# Patient Record
Sex: FEMALE | Race: BLACK OR AFRICAN AMERICAN | NOT HISPANIC OR LATINO | URBAN - METROPOLITAN AREA
[De-identification: names, ages, dates, MRNs, and addresses within clinical notes are randomized per-mention and may not be internally consistent; named-entity substitution may affect disease eponyms.]

---

## 2022-12-27 ENCOUNTER — INPATIENT (INPATIENT)
Facility: HOSPITAL | Age: 82
LOS: 2 days | Discharge: NURSING, REHAB OR LONG-TERM CARE | DRG: 175 | End: 2022-12-30
Admitting: INTERNAL MEDICINE
Payer: MEDICARE

## 2022-12-27 VITALS
OXYGEN SATURATION: 97 % | SYSTOLIC BLOOD PRESSURE: 131 MMHG | RESPIRATION RATE: 20 BRPM | TEMPERATURE: 99 F | DIASTOLIC BLOOD PRESSURE: 67 MMHG | HEART RATE: 106 BPM

## 2022-12-27 DIAGNOSIS — F03.90 UNSPECIFIED DEMENTIA WITHOUT BEHAVIORAL DISTURBANCE: ICD-10-CM

## 2022-12-27 DIAGNOSIS — I26.99 OTHER PULMONARY EMBOLISM WITHOUT ACUTE COR PULMONALE: ICD-10-CM

## 2022-12-27 DIAGNOSIS — Z98.890 OTHER SPECIFIED POSTPROCEDURAL STATES: Chronic | ICD-10-CM

## 2022-12-27 PROCEDURE — 99291 CRITICAL CARE FIRST HOUR: CPT

## 2022-12-27 PROCEDURE — 99292 CRITICAL CARE ADDL 30 MIN: CPT

## 2022-12-27 PROCEDURE — 99223 1ST HOSP IP/OBS HIGH 75: CPT

## 2022-12-27 RX ORDER — PHENYLEPHRINE HYDROCHLORIDE 10 MG/ML
1 INJECTION INTRAVENOUS
Qty: 40 | Refills: 0 | Status: DISCONTINUED | OUTPATIENT
Start: 2022-12-27 | End: 2022-12-28

## 2022-12-27 RX ORDER — FAMOTIDINE 10 MG/ML
20 INJECTION INTRAVENOUS
Refills: 0 | Status: DISCONTINUED | OUTPATIENT
Start: 2022-12-27 | End: 2022-12-30

## 2022-12-27 RX ORDER — DOBUTAMINE HCL 250MG/20ML
2.5 VIAL (ML) INTRAVENOUS
Qty: 500 | Refills: 0 | Status: DISCONTINUED | OUTPATIENT
Start: 2022-12-27 | End: 2022-12-28

## 2022-12-27 RX ORDER — SODIUM CHLORIDE 9 MG/ML
3 INJECTION INTRAMUSCULAR; INTRAVENOUS; SUBCUTANEOUS EVERY 8 HOURS
Refills: 0 | Status: DISCONTINUED | OUTPATIENT
Start: 2022-12-27 | End: 2022-12-30

## 2022-12-27 RX ORDER — HEPARIN SODIUM 5000 [USP'U]/ML
800 INJECTION INTRAVENOUS; SUBCUTANEOUS
Qty: 25000 | Refills: 0 | Status: DISCONTINUED | OUTPATIENT
Start: 2022-12-27 | End: 2022-12-29

## 2022-12-27 NOTE — H&P ADULT - HISTORY OF PRESENT ILLNESS
83 y/o F transfer [facility] pmhx dementia?,  epilepsy, GERD, diverticulitis s/p bowel resection, SVT and recent ORIF for femoral neck fx who presents from NH with AMS acute hypoxic respiratory failure. Patient admitted to ICU with working diagnosis of septic shock due to presume pneumonia. The patient has had a persistent low dose vasopressor requirement. This am the patient developed new onset Afib with RVR and increasing hypoxia. She later underwent CTA PE and was found to have extensive bilateral PEs with e/o right heart strain. Ultimately decision made to administer 1/2 dose tPA. Her pressors were changed to phenylephrine and dobutamine from levophed. Transferred on heparin drip on nasal cannula stable. Admit to Dr. Martínez for evaluation.

## 2022-12-27 NOTE — H&P ADULT - NSICDXPASTMEDICALHX_GEN_ALL_CORE_FT
PAST MEDICAL HISTORY:  Epilepsy     Femoral neck fracture     SVT (supraventricular tachycardia)

## 2022-12-27 NOTE — H&P ADULT - NSHPLABSRESULTS_GEN_ALL_CORE
83 y/o F [facility]?,  epilepsy, GERD, diverticulitis s/p bowel resection, SVT and recent ORIF for femoral neck fx who presents from NH with AMS acute hypoxic respiratory failure. Patient admitted to ICU with working diagnosis of septic shock due to presume pneumonia. The patient has had a persistent low dose vasopressor requirement. This am the patient developed new onset Afib with RVR and increasing hypoxia. She later underwent CTA PE and was found to have extensive bilateral PEs with e/o right heart strain. Ultimately decision made to administer 1/2 dose tPA. Her pressors were changed to phenylephrine and dobutamine from levophed. Transferred on heparin drip on nasal cannula stable. Admit to Dr. Matrínez for evaluation.

## 2022-12-27 NOTE — H&P ADULT - ASSESSMENT
81 y/o F [facility] pmhx dementia?,  epilepsy, GERD, diverticulitis s/p bowel resection, SVT and recent ORIF for femoral neck fx who presents from NH with AMS acute hypoxic respiratory failure. Patient admitted to ICU with working diagnosis of septic shock due to presume pneumonia. The patient has had a persistent low dose vasopressor requirement. This am the patient developed new onset Afib with RVR and increasing hypoxia. She later underwent CTA PE and was found to have extensive bilateral PEs with e/o right heart strain. Ultimately decision made to administer 1/2 dose tPA. Her pressors were changed to phenylephrine and dobutamine from levophed. Transferred on heparin drip on nasal cannula stable. Admit to Dr. Martínez for evaluation.

## 2022-12-27 NOTE — H&P ADULT - NSHPPHYSICALEXAM_GEN_ALL_CORE
Physical Exam  CONSTITUTIONAL:                                                              cachectic  NEURO:                                                                       decreased mental status /dementia?                      EYES:                                                                                WNL  ENMT:                                                                               WNL  CV:                                                                                   WNL  RESPIRATORY:                                                                no wheezes  GI:                                                                                     WNL  : BOATENG +                                                               boateng in place  MUSCULOSKELETAL:                                                       WNL  SKIN / BREAST:                                                                  WNL

## 2022-12-27 NOTE — H&P ADULT - PROBLEM SELECTOR PLAN 1
full dose heparin with serial PTT  NPO  continue dobutamine  wheen Phenylphrine if possible  amiodarone prn for AF

## 2022-12-28 DIAGNOSIS — G40.909 EPILEPSY, UNSPECIFIED, NOT INTRACTABLE, WITHOUT STATUS EPILEPTICUS: ICD-10-CM

## 2022-12-28 DIAGNOSIS — I47.1 SUPRAVENTRICULAR TACHYCARDIA: ICD-10-CM

## 2022-12-28 DIAGNOSIS — I26.99 OTHER PULMONARY EMBOLISM WITHOUT ACUTE COR PULMONALE: ICD-10-CM

## 2022-12-28 DIAGNOSIS — I48.91 UNSPECIFIED ATRIAL FIBRILLATION: ICD-10-CM

## 2022-12-28 DIAGNOSIS — Z29.9 ENCOUNTER FOR PROPHYLACTIC MEASURES, UNSPECIFIED: ICD-10-CM

## 2022-12-28 DIAGNOSIS — Z87.898 PERSONAL HISTORY OF OTHER SPECIFIED CONDITIONS: ICD-10-CM

## 2022-12-28 DIAGNOSIS — I82.412 ACUTE EMBOLISM AND THROMBOSIS OF LEFT FEMORAL VEIN: ICD-10-CM

## 2022-12-28 LAB
A1C WITH ESTIMATED AVERAGE GLUCOSE RESULT: 5 % — SIGNIFICANT CHANGE UP (ref 4–5.6)
ALBUMIN SERPL ELPH-MCNC: 2 G/DL — LOW (ref 3.3–5)
ALBUMIN SERPL ELPH-MCNC: 2.1 G/DL — LOW (ref 3.3–5)
ALBUMIN SERPL ELPH-MCNC: 2.3 G/DL — LOW (ref 3.3–5)
ALP SERPL-CCNC: 131 U/L — HIGH (ref 40–120)
ALP SERPL-CCNC: 79 U/L — SIGNIFICANT CHANGE UP (ref 40–120)
ALP SERPL-CCNC: 87 U/L — SIGNIFICANT CHANGE UP (ref 40–120)
ALT FLD-CCNC: 7 U/L — LOW (ref 10–45)
ALT FLD-CCNC: 7 U/L — LOW (ref 10–45)
ALT FLD-CCNC: 8 U/L — LOW (ref 10–45)
ANION GAP SERPL CALC-SCNC: 8 MMOL/L — SIGNIFICANT CHANGE UP (ref 5–17)
APTT BLD: 66.9 SEC — HIGH (ref 27.5–35.5)
APTT BLD: 67.7 SEC — HIGH (ref 27.5–35.5)
APTT BLD: 69.1 SEC — HIGH (ref 27.5–35.5)
AST SERPL-CCNC: 12 U/L — SIGNIFICANT CHANGE UP (ref 10–40)
BILIRUB SERPL-MCNC: 0.4 MG/DL — SIGNIFICANT CHANGE UP (ref 0.2–1.2)
BILIRUB SERPL-MCNC: 0.8 MG/DL — SIGNIFICANT CHANGE UP (ref 0.2–1.2)
BILIRUB SERPL-MCNC: 1 MG/DL — SIGNIFICANT CHANGE UP (ref 0.2–1.2)
BLD GP AB SCN SERPL QL: NEGATIVE — SIGNIFICANT CHANGE UP
BUN SERPL-MCNC: 10 MG/DL — SIGNIFICANT CHANGE UP (ref 7–23)
BUN SERPL-MCNC: 10 MG/DL — SIGNIFICANT CHANGE UP (ref 7–23)
BUN SERPL-MCNC: 8 MG/DL — SIGNIFICANT CHANGE UP (ref 7–23)
CALCIUM SERPL-MCNC: 7.8 MG/DL — LOW (ref 8.4–10.5)
CALCIUM SERPL-MCNC: 7.9 MG/DL — LOW (ref 8.4–10.5)
CALCIUM SERPL-MCNC: 8 MG/DL — LOW (ref 8.4–10.5)
CHLORIDE SERPL-SCNC: 97 MMOL/L — SIGNIFICANT CHANGE UP (ref 96–108)
CHLORIDE SERPL-SCNC: 99 MMOL/L — SIGNIFICANT CHANGE UP (ref 96–108)
CHLORIDE SERPL-SCNC: 99 MMOL/L — SIGNIFICANT CHANGE UP (ref 96–108)
CHOLEST SERPL-MCNC: 127 MG/DL — SIGNIFICANT CHANGE UP
CO2 SERPL-SCNC: 23 MMOL/L — SIGNIFICANT CHANGE UP (ref 22–31)
CO2 SERPL-SCNC: 23 MMOL/L — SIGNIFICANT CHANGE UP (ref 22–31)
CO2 SERPL-SCNC: 24 MMOL/L — SIGNIFICANT CHANGE UP (ref 22–31)
CREAT SERPL-MCNC: 0.34 MG/DL — LOW (ref 0.5–1.3)
CREAT SERPL-MCNC: 0.39 MG/DL — LOW (ref 0.5–1.3)
CREAT SERPL-MCNC: 0.4 MG/DL — LOW (ref 0.5–1.3)
EGFR: 101 ML/MIN/1.73M2 — SIGNIFICANT CHANGE UP
EGFR: 98 ML/MIN/1.73M2 — SIGNIFICANT CHANGE UP
EGFR: 98 ML/MIN/1.73M2 — SIGNIFICANT CHANGE UP
ESTIMATED AVERAGE GLUCOSE: 97 MG/DL — SIGNIFICANT CHANGE UP (ref 68–114)
GAS PNL BLDA: SIGNIFICANT CHANGE UP
GAS PNL BLDA: SIGNIFICANT CHANGE UP
GLUCOSE BLDC GLUCOMTR-MCNC: 69 MG/DL — LOW (ref 70–99)
GLUCOSE BLDC GLUCOMTR-MCNC: 83 MG/DL — SIGNIFICANT CHANGE UP (ref 70–99)
GLUCOSE SERPL-MCNC: 73 MG/DL — SIGNIFICANT CHANGE UP (ref 70–99)
GLUCOSE SERPL-MCNC: 87 MG/DL — SIGNIFICANT CHANGE UP (ref 70–99)
GLUCOSE SERPL-MCNC: 88 MG/DL — SIGNIFICANT CHANGE UP (ref 70–99)
HCT VFR BLD CALC: 28.4 % — LOW (ref 34.5–45)
HCT VFR BLD CALC: 29 % — LOW (ref 34.5–45)
HCT VFR BLD CALC: 29.1 % — LOW (ref 34.5–45)
HDLC SERPL-MCNC: 42 MG/DL — LOW
HGB BLD-MCNC: 10.1 G/DL — LOW (ref 11.5–15.5)
HGB BLD-MCNC: 10.1 G/DL — LOW (ref 11.5–15.5)
HGB BLD-MCNC: 9.9 G/DL — LOW (ref 11.5–15.5)
INR BLD: 1.58 — HIGH (ref 0.88–1.16)
INR BLD: 1.6 — HIGH (ref 0.88–1.16)
INR BLD: 1.61 — HIGH (ref 0.88–1.16)
LACTATE SERPL-SCNC: 0.6 MMOL/L — SIGNIFICANT CHANGE UP (ref 0.5–2)
LACTATE SERPL-SCNC: 0.7 MMOL/L — SIGNIFICANT CHANGE UP (ref 0.5–2)
LIPID PNL WITH DIRECT LDL SERPL: 67 MG/DL — SIGNIFICANT CHANGE UP
MAGNESIUM SERPL-MCNC: 1.8 MG/DL — SIGNIFICANT CHANGE UP (ref 1.6–2.6)
MAGNESIUM SERPL-MCNC: 1.8 MG/DL — SIGNIFICANT CHANGE UP (ref 1.6–2.6)
MAGNESIUM SERPL-MCNC: 1.9 MG/DL — SIGNIFICANT CHANGE UP (ref 1.6–2.6)
MCHC RBC-ENTMCNC: 31.3 PG — SIGNIFICANT CHANGE UP (ref 27–34)
MCHC RBC-ENTMCNC: 31.6 PG — SIGNIFICANT CHANGE UP (ref 27–34)
MCHC RBC-ENTMCNC: 31.7 PG — SIGNIFICANT CHANGE UP (ref 27–34)
MCHC RBC-ENTMCNC: 34.7 GM/DL — SIGNIFICANT CHANGE UP (ref 32–36)
MCHC RBC-ENTMCNC: 34.8 GM/DL — SIGNIFICANT CHANGE UP (ref 32–36)
MCHC RBC-ENTMCNC: 34.9 GM/DL — SIGNIFICANT CHANGE UP (ref 32–36)
MCV RBC AUTO: 90.1 FL — SIGNIFICANT CHANGE UP (ref 80–100)
MCV RBC AUTO: 90.7 FL — SIGNIFICANT CHANGE UP (ref 80–100)
MCV RBC AUTO: 90.9 FL — SIGNIFICANT CHANGE UP (ref 80–100)
NON HDL CHOLESTEROL: 85 MG/DL — SIGNIFICANT CHANGE UP
NRBC # BLD: 0 /100 WBCS — SIGNIFICANT CHANGE UP (ref 0–0)
NT-PROBNP SERPL-SCNC: HIGH PG/ML (ref 0–300)
OSMOLALITY UR: 472 MOSM/KG — SIGNIFICANT CHANGE UP (ref 300–900)
PHOSPHATE SERPL-MCNC: 2.8 MG/DL — SIGNIFICANT CHANGE UP (ref 2.5–4.5)
PHOSPHATE SERPL-MCNC: 3 MG/DL — SIGNIFICANT CHANGE UP (ref 2.5–4.5)
PHOSPHATE SERPL-MCNC: 3.4 MG/DL — SIGNIFICANT CHANGE UP (ref 2.5–4.5)
PLATELET # BLD AUTO: 194 K/UL — SIGNIFICANT CHANGE UP (ref 150–400)
PLATELET # BLD AUTO: 199 K/UL — SIGNIFICANT CHANGE UP (ref 150–400)
PLATELET # BLD AUTO: 204 K/UL — SIGNIFICANT CHANGE UP (ref 150–400)
POTASSIUM SERPL-MCNC: 3.5 MMOL/L — SIGNIFICANT CHANGE UP (ref 3.5–5.3)
POTASSIUM SERPL-MCNC: 3.6 MMOL/L — SIGNIFICANT CHANGE UP (ref 3.5–5.3)
POTASSIUM SERPL-MCNC: 3.6 MMOL/L — SIGNIFICANT CHANGE UP (ref 3.5–5.3)
POTASSIUM SERPL-SCNC: 3.5 MMOL/L — SIGNIFICANT CHANGE UP (ref 3.5–5.3)
POTASSIUM SERPL-SCNC: 3.6 MMOL/L — SIGNIFICANT CHANGE UP (ref 3.5–5.3)
POTASSIUM SERPL-SCNC: 3.6 MMOL/L — SIGNIFICANT CHANGE UP (ref 3.5–5.3)
PROT SERPL-MCNC: 4.8 G/DL — LOW (ref 6–8.3)
PROT SERPL-MCNC: 5 G/DL — LOW (ref 6–8.3)
PROT SERPL-MCNC: 5.1 G/DL — LOW (ref 6–8.3)
PROTHROM AB SERPL-ACNC: 18.9 SEC — HIGH (ref 10.5–13.4)
PROTHROM AB SERPL-ACNC: 19.1 SEC — HIGH (ref 10.5–13.4)
PROTHROM AB SERPL-ACNC: 19.2 SEC — HIGH (ref 10.5–13.4)
RBC # BLD: 3.13 M/UL — LOW (ref 3.8–5.2)
RBC # BLD: 3.19 M/UL — LOW (ref 3.8–5.2)
RBC # BLD: 3.23 M/UL — LOW (ref 3.8–5.2)
RBC # FLD: 14.9 % — HIGH (ref 10.3–14.5)
RBC # FLD: 15 % — HIGH (ref 10.3–14.5)
RBC # FLD: 15 % — HIGH (ref 10.3–14.5)
RH IG SCN BLD-IMP: POSITIVE — SIGNIFICANT CHANGE UP
SODIUM SERPL-SCNC: 128 MMOL/L — LOW (ref 135–145)
SODIUM SERPL-SCNC: 130 MMOL/L — LOW (ref 135–145)
SODIUM SERPL-SCNC: 131 MMOL/L — LOW (ref 135–145)
SODIUM UR-SCNC: 163 MMOL/L — SIGNIFICANT CHANGE UP
T4 AB SER-ACNC: 5.54 UG/DL — SIGNIFICANT CHANGE UP (ref 4.5–11.7)
TRIGL SERPL-MCNC: 89 MG/DL — SIGNIFICANT CHANGE UP
TROPONIN T SERPL-MCNC: 0.06 NG/ML — CRITICAL HIGH (ref 0–0.01)
TSH SERPL-MCNC: 4.37 UIU/ML — HIGH (ref 0.27–4.2)
WBC # BLD: 10.98 K/UL — HIGH (ref 3.8–10.5)
WBC # BLD: 12.3 K/UL — HIGH (ref 3.8–10.5)
WBC # BLD: 12.35 K/UL — HIGH (ref 3.8–10.5)
WBC # FLD AUTO: 10.98 K/UL — HIGH (ref 3.8–10.5)
WBC # FLD AUTO: 12.3 K/UL — HIGH (ref 3.8–10.5)
WBC # FLD AUTO: 12.35 K/UL — HIGH (ref 3.8–10.5)

## 2022-12-28 PROCEDURE — 99292 CRITICAL CARE ADDL 30 MIN: CPT | Mod: 25

## 2022-12-28 PROCEDURE — 71045 X-RAY EXAM CHEST 1 VIEW: CPT | Mod: 26

## 2022-12-28 PROCEDURE — 99233 SBSQ HOSP IP/OBS HIGH 50: CPT | Mod: GC

## 2022-12-28 PROCEDURE — 36620 INSERTION CATHETER ARTERY: CPT

## 2022-12-28 PROCEDURE — 93306 TTE W/DOPPLER COMPLETE: CPT | Mod: 26

## 2022-12-28 PROCEDURE — 99291 CRITICAL CARE FIRST HOUR: CPT | Mod: 25

## 2022-12-28 RX ORDER — POTASSIUM CHLORIDE 20 MEQ
20 PACKET (EA) ORAL
Refills: 0 | Status: COMPLETED | OUTPATIENT
Start: 2022-12-28 | End: 2022-12-28

## 2022-12-28 RX ORDER — SODIUM CHLORIDE 9 MG/ML
500 INJECTION, SOLUTION INTRAVENOUS
Refills: 0 | Status: DISCONTINUED | OUTPATIENT
Start: 2022-12-28 | End: 2022-12-29

## 2022-12-28 RX ORDER — DIVALPROEX SODIUM 500 MG/1
500 TABLET, DELAYED RELEASE ORAL
Refills: 0 | Status: DISCONTINUED | OUTPATIENT
Start: 2022-12-28 | End: 2022-12-30

## 2022-12-28 RX ORDER — PHENOBARBITAL 60 MG
16.2 TABLET ORAL EVERY 12 HOURS
Refills: 0 | Status: DISCONTINUED | OUTPATIENT
Start: 2022-12-28 | End: 2022-12-30

## 2022-12-28 RX ORDER — MAGNESIUM SULFATE 500 MG/ML
2 VIAL (ML) INJECTION ONCE
Refills: 0 | Status: COMPLETED | OUTPATIENT
Start: 2022-12-28 | End: 2022-12-28

## 2022-12-28 RX ADMIN — DIVALPROEX SODIUM 500 MILLIGRAM(S): 500 TABLET, DELAYED RELEASE ORAL at 10:22

## 2022-12-28 RX ADMIN — Medication 25 GRAM(S): at 11:54

## 2022-12-28 RX ADMIN — SODIUM CHLORIDE 3 MILLILITER(S): 9 INJECTION INTRAMUSCULAR; INTRAVENOUS; SUBCUTANEOUS at 22:33

## 2022-12-28 RX ADMIN — Medication 50 MILLIEQUIVALENT(S): at 10:59

## 2022-12-28 RX ADMIN — FAMOTIDINE 20 MILLIGRAM(S): 10 INJECTION INTRAVENOUS at 18:26

## 2022-12-28 RX ADMIN — Medication 16.2 MILLIGRAM(S): at 10:20

## 2022-12-28 RX ADMIN — SODIUM CHLORIDE 3 MILLILITER(S): 9 INJECTION INTRAMUSCULAR; INTRAVENOUS; SUBCUTANEOUS at 06:32

## 2022-12-28 RX ADMIN — Medication 50 MILLIEQUIVALENT(S): at 12:51

## 2022-12-28 RX ADMIN — SODIUM CHLORIDE 3 MILLILITER(S): 9 INJECTION INTRAMUSCULAR; INTRAVENOUS; SUBCUTANEOUS at 15:48

## 2022-12-28 RX ADMIN — Medication 50 MILLIEQUIVALENT(S): at 14:57

## 2022-12-28 NOTE — PATIENT PROFILE ADULT - FALL HARM RISK - HARM RISK INTERVENTIONS
Assistance with ambulation/Assistance OOB with selected safe patient handling equipment/Communicate Risk of Fall with Harm to all staff/Discuss with provider need for PT consult/Monitor for mental status changes/Monitor gait and stability/Reinforce activity limits and safety measures with patient and family/Reorient to person, place and time as needed/Tailored Fall Risk Interventions/Visual Cue: Yellow wristband and red socks/Bed in lowest position, wheels locked, appropriate side rails in place/Call bell, personal items and telephone in reach/Instruct patient to call for assistance before getting out of bed or chair/Non-slip footwear when patient is out of bed/[place] to call system/Physically safe environment - no spills, clutter or unnecessary equipment/Purposeful Proactive Rounding/Room/bathroom lighting operational, light cord in reach

## 2022-12-28 NOTE — PROGRESS NOTE ADULT - PROBLEM SELECTOR PLAN 1
- full dose heparin with serial PTT  - NPO  - continue dobutamine  - can continue to wean off Phenylphrine Patient tranferred from OSH with massive b/l PE's  - full dose heparin with serial PTT, 12/28 PTT 69.1  - continue dobutamine  - can continue to wean off Phenylphrine Patient transferred from OSH with massive b/l PE's  - full dose heparin with serial PTT, 12/28 PTT 69.1  - continue dobutamine  - can continue to wean off Phenylphrine Patient transferred from OSH with massive b/l PE's and LLE DVT.   - full dose heparin gtt with serial PTT  - 12/28 PTT 69.1, f/u AM PTT  - echo on 12/28 showed: 1. Normal left ventricular size and systolic function.   2. Mildly dilated right ventricular size.   3. Right ventricular systolic function is reduced with apical sparing consistent with acute pulmonary embolism (Fulton's sign).   4. Mildly dilated right atrium.   5. Mild-to-moderate mitral regurgitation.   6. Mild tricuspid regurgitation.   7. Aortic sclerosis without significant stenosis.   8. Pulmonary hypertension present, pulmonary artery systolic pressure is 44 mmHg.   9. No pericardial effusion.  10. Left pleural effusion.

## 2022-12-28 NOTE — DIETITIAN INITIAL EVALUATION ADULT - PERTINENT LABORATORY DATA
12-28    131<L>  |  99  |  8   ----------------------------<  73  3.5   |  24  |  0.34<L>    Ca    7.9<L>      28 Dec 2022 08:48  Phos  3.4     12-28  Mg     1.9     12-28    TPro  4.8<L>  /  Alb  2.3<L>  /  TBili  0.4  /  DBili  x   /  AST  12  /  ALT  8<L>  /  AlkPhos  79  12-28  POCT Blood Glucose.: 83 mg/dL (12-28-22 @ 10:58)  A1C with Estimated Average Glucose Result: 5.0 % (12-27-22 @ 23:52)

## 2022-12-28 NOTE — PROGRESS NOTE ADULT - ASSESSMENT
83 y/o F [facility]?,  epilepsy, GERD, diverticulitis s/p bowel resection, SVT and recent ORIF for femoral neck fx who presents from NH with AMS acute hypoxic respiratory failure. Patient admitted to ICU with working diagnosis of septic shock due to presume pneumonia. The patient has had a persistent low dose vasopressor requirement. This am the patient developed new onset Afib with RVR and increasing hypoxia. She later underwent CTA PE and was found to have extensive bilateral PEs with e/o right heart strain. Ultimately decision made to administer 1/2 dose tPA. Her pressors were changed to phenylephrine and dobutamine from levophed. Transferred on heparin drip on nasal cannula stable. Admit to Dr. Martínez for evaluation.   81 y/o F transfer from [facility] transferred to [facility] CT surgery for massive b/l PE, stepped down to [facility] for medical management.  81 y/o F transfer from [facility] transferred to [facility] CT surgery for massive b/l PE and LLE DVT, stepped down to [facility] for medical management.

## 2022-12-28 NOTE — PROGRESS NOTE ADULT - PROBLEM SELECTOR PLAN 5
F: LR 500cc, running at 30cc/hr  E: replete as needed   N: DASH diet   DVT ppx: heparin gtt  GI ppx: - will have to call [facility] in AM for collateral  - patient examined at bedside AAOx2 to person and time.

## 2022-12-28 NOTE — DIETITIAN INITIAL EVALUATION ADULT - ADD RECOMMEND
1. Recommend liberalize diet to regular diet  --Recommend add Ensure enlive BID (+350kcal, 20g protein)  2. Follow intake closely, adjust prn  3. Monitor electorlytes, adjust and replete prn  4. Pain and bowel regimen per team   5. RD diet edu prn

## 2022-12-28 NOTE — PROGRESS NOTE ADULT - PROBLEM SELECTOR PLAN 2
Patient with new-onset AFib w RVR at OSH, converted to NSR on amio gtt. LE doppler at OSH showed L femoral DVT. LLE warm to palpation on PE.   - management as above  - avoid SCD's on LLE

## 2022-12-28 NOTE — PROGRESS NOTE ADULT - SUBJECTIVE AND OBJECTIVE BOX
CTICU ATTENDING   82 F with working dx of acute massive PE associated with hypoxia and hemodynamic compromise transferred from [facility] for consideration of mechanical thrombectomy.   data obtained from OSH chart, patient with hx of dementia unable to provide history   Received in stable condition on low dose dobutamine and phenylephrine which titrated off upon arrival   Patient is alert and awake, tachycardic 100-110, on 4 L NC satting in high 90s not in distress and warm on exam     VITALS  Vital Signs Last 24 Hrs  HR: 107 (27 Dec 2022 23:24) (101 - 109)  BP: 126/67 (27 Dec 2022 23:24) (125/74 - 153/67)  BP(mean): 91 (27 Dec 2022 23:24) (90 - 94)  RR: 22 (27 Dec 2022 23:24) (19 - 22)  SpO2: 98% (27 Dec 2022 23:24) (97% - 100%)  Parameters below as of 27 Dec 2022 23:24  Patient On (Oxygen Delivery Method): nasal cannula w/ humidification  O2 Flow (L/min): 5    I&O's Summary    27 Dec 2022 07:01  -  28 Dec 2022 00:26  --------------------------------------------------------  IN: 24 mL / OUT: 0 mL / NET: 24 mL    CAPILLARY BLOOD GLUCOSE      PHYSICAL EXAM  General: Alert and awake, oriented to person  Respiratory: CTA B/L; no wheezes/crackles/rales auscultated w/ good air movement  Cardiovascular: Sinus tachycardia   Gastrointestinal: Soft; NTND   Extremities: WWP; no edema   Neurological: appears nonfocal     MEDICATIONS  (STANDING):  DOBUTamine Infusion 2.5 MICROgram(s)/kG/Min (3.75 mL/Hr) IV Continuous <Continuous>  famotidine    Tablet 20 milliGRAM(s) Oral two times a day  heparin  Infusion 800 Unit(s)/Hr (8 mL/Hr) IV Continuous <Continuous>  lactated ringers. 500 milliLiter(s) (30 mL/Hr) IV Continuous <Continuous>  phenylephrine    Infusion 1 MICROgram(s)/kG/Min (18.8 mL/Hr) IV Continuous <Continuous>  sodium chloride 0.9% lock flush 3 milliLiter(s) IV Push every 8 hours        LABS                        9.9    12.35 )-----------( 194      ( 27 Dec 2022 23:52 )             28.4       PT/INR - ( 27 Dec 2022 23:52 )   PT: 18.9 sec;   INR: 1.58          PTT - ( 27 Dec 2022 23:52 )  PTT:66.9 sec      IMAGING/EKG/ETC  Reviewed.    CTICU ATTENDING   82 F with working dx of acute massive PE associated with hypoxia and hemodynamic compromise transferred from [facility] for consideration of mechanical thrombectomy.   data obtained from OSH chart, patient with hx of dementia unable to provide history   Received in stable condition on low dose dobutamine and phenylephrine which titrated off upon arrival   Patient is alert and awake, tachycardic 100-110, on 4 L NC satting in high 90s not in distress and warm on exam     VITALS  Vital Signs Last 24 Hrs  HR: 107 (27 Dec 2022 23:24) (101 - 109)  BP: 126/67 (27 Dec 2022 23:24) (125/74 - 153/67)  BP(mean): 91 (27 Dec 2022 23:24) (90 - 94)  RR: 22 (27 Dec 2022 23:24) (19 - 22)  SpO2: 98% (27 Dec 2022 23:24) (97% - 100%)  Parameters below as of 27 Dec 2022 23:24  Patient On (Oxygen Delivery Method): nasal cannula w/ humidification  O2 Flow (L/min): 5    I&O's Summary    27 Dec 2022 07:01  -  28 Dec 2022 00:26  --------------------------------------------------------  IN: 24 mL / OUT: 0 mL / NET: 24 mL    CAPILLARY BLOOD GLUCOSE      PHYSICAL EXAM  General: Alert and awake, oriented to self  Respiratory: diffuse mild rhonchi, fine basilar crackles   Cardiovascular: Sinus tachycardia   Gastrointestinal: Soft; NTND   Extremities: WWP; no edema   Neurological: appears nonfocal     MEDICATIONS  (STANDING):  DOBUTamine Infusion 2.5 MICROgram(s)/kG/Min (3.75 mL/Hr) IV Continuous <Continuous>  famotidine    Tablet 20 milliGRAM(s) Oral two times a day  heparin  Infusion 800 Unit(s)/Hr (8 mL/Hr) IV Continuous <Continuous>  lactated ringers. 500 milliLiter(s) (30 mL/Hr) IV Continuous <Continuous>  phenylephrine    Infusion 1 MICROgram(s)/kG/Min (18.8 mL/Hr) IV Continuous <Continuous>  sodium chloride 0.9% lock flush 3 milliLiter(s) IV Push every 8 hours        LABS                        9.9    12.35 )-----------( 194      ( 27 Dec 2022 23:52 )             28.4       PT/INR - ( 27 Dec 2022 23:52 )   PT: 18.9 sec;   INR: 1.58          PTT - ( 27 Dec 2022 23:52 )  PTT:66.9 sec      IMAGING/EKG/ETC  Reviewed.

## 2022-12-28 NOTE — DIETITIAN INITIAL EVALUATION ADULT - OTHER INFO
82 F resident of [facility], initially admitted at [facility] 12/25 for hypoxia attributed to pneumonia with UTI, also found to have bilateral massive PE associated with hemodynamic compromise and shock requiring initiation of phenylephrine and dobutamine. Transferred to [facility] for consideration of mechanical thrombectomy given large proximal thrombus burden.     Pt assessed at bedside. Resting in bed on 4L NC satting %. Started on DASH/TLC diet. Poor intake noted since admission. Limited interview with pt this am with noted history of dementia. Unable to fully quantify intake or weight changes PTA, however noted limited intake for a few days since onset of symptoms and acute hospitalization. Pt presents very thin with evident muscle/fat wasting, age related sarcopenia. Briefly discussed adequate intake. No pain noted. Ted score 11. RD to follow, nutrition recommendations below.

## 2022-12-28 NOTE — PROGRESS NOTE ADULT - PROBLEM SELECTOR PLAN 3
Patient with new-onset AFib w RVR at OSH, converted to NSR on amio gtt. on home meds: depakote 500mg BID and phenobarbital 16.2mg BID  - c/w home meds Patient with new-onset AFib w RVR at OSH s/p amio bolus converted to NSR.  - continue to monitor  - can consider eliquis

## 2022-12-28 NOTE — DIETITIAN INITIAL EVALUATION ADULT - OTHER CALCULATIONS
IBW 50kg (100% IBW); estimated needs based on ABW; needs adjusted for acute resp failure, age, repletion

## 2022-12-28 NOTE — CHART NOTE - NSCHARTNOTEFT_GEN_A_CORE
83 y/o F transfer from [facility] from [facility] with pmhx dementia?,  epilepsy, GERD, diverticulitis s/p bowel resection, SVT and recent ORIF for femoral neck fx who presents from NH with AMS acute hypoxic respiratory failure. Patient admitted to ICU with working diagnosis of septic shock due to presume pneumonia. The patient has had a persistent low dose vasopressor requirement. This am the patient developed new onset Afib with RVR and increasing hypoxia. She later underwent CTA PE and was found to have extensive bilateral PEs with e/o right heart strain. Ultimately decision made to administer 1/2 dose tPA. Her pressors were changed to phenylephrine and dobutamine from levophed. Transferred on heparin drip on nasal cannula stable. Admit to Dr. Martínez for evaluation.  Patient deemed not a surgical candidate.  Dobutamin and phenylephrine weaned off on 12/28/22.   Patient transferred to medicine for further management.  Sign out given to Dr. Felipe.    ==== Neurovascular ====  -No delirium, pain well managed on current regimen  -C/w PRNs for Pain control  -Monitor neuro status    ==== Respiratory ====  -Saturates well on 2L NC  -AM CXR stable, repeat in AM  -Encourage IS 10x/hour while awake, Cough and deep breathing exercises  -Monitor respiratory status via SpO2  -Continue Heparin gtt, PTT therapeutic x3    ==== Cardiovascular ====  Monitor on Tele    ==== GI ====   -Tolerating PO  -Prophylaxis: Protonix  -C/w bowel regimen    ==== /Renal ====  -BUN/Cr: 8/0.34  -Trend Cr on AM labs  -Replete electrolytes as needed    ==== ID ====   Afebrile, asymptomatic  -WBC: 10.98  -Continue to monitor for SIRS/Sepsis syndrome while inpatient    ==== Endocrine ====   -A1C: 5, no h/o DM  -TSH: 4.750, no h/o thyroid disease     ==== Hematologic ====   -H/H: 10/29  -DVT ppx: Heparin gtt and SCDs    ==== Disposition Planning ====  Telemetry    Will need to obtain med rec from [facility], attempted today but no response

## 2022-12-28 NOTE — PROGRESS NOTE ADULT - SUBJECTIVE AND OBJECTIVE BOX
----STEPDOWN FROM [facility] TO [facility]----    Hospital Course:  83 y/o F transfer from [facility]. Pt [facility] with pmhx dementia?,  epilepsy, GERD, diverticulitis s/p bowel resection, SVT and recent ORIF for femoral neck fx. She presented with AMS and acute hypoxic respiratory failure. Patient admitted to ICU with working diagnosis of septic shock due to presumed pneumonia, s/p vancomycin and aztreonam (reported PCN and clindamycin allergy). The patient had a persistent low dose vasopressor requirement. The following morning she developed new-onset Afib with RVR and increasing hypoxia, which responded to amio bolus. She later underwent CTA PE and was found to have extensive bilateral PEs with evidence of right heart strain. There was also some report of a left femoral DVT. TTE showed preserved LVEF 65%/ RA dilation with septal flattening/ mild Pulm HTN PASP 46. Ultimately decision made to administer 1/2 dose tPA. Her pressors were changed from levophed to phenylephrine and dobutamine Transferred  to [facility] on heparin drip on 4L NC stable. Admitted to CT surgery, Dr. Martínez, for consideration of mechanical thrombectomy given large proximal thrombus burden. Patient was titrated off phenylephrine, dobutamine cut to half for HTN. Patient is on heparin gtt aPTT therapeutic ~ 66.    Subjective/ROS: Patient seen and examined at bedside.     Denies Fever/Chills, HA, CP, SOB, n/v, changes in bowel/urinary habits.  12pt ROS otherwise negative.    VITALS  Vital Signs Last 24 Hrs  T(C): 36.7 (28 Dec 2022 17:13), Max: 37.1 (27 Dec 2022 22:22)  T(F): 98.1 (28 Dec 2022 17:13), Max: 98.8 (27 Dec 2022 22:22)  HR: 106 (28 Dec 2022 18:00) (98 - 111)  BP: 126/67 (27 Dec 2022 23:24) (125/74 - 153/67)  BP(mean): 91 (27 Dec 2022 23:24) (90 - 94)  RR: 27 (28 Dec 2022 18:00) (15 - 33)  SpO2: 94% (28 Dec 2022 18:00) (94% - 100%)    Parameters below as of 28 Dec 2022 18:00  Patient On (Oxygen Delivery Method): nasal cannula w/ humidification  O2 Flow (L/min): 4      CAPILLARY BLOOD GLUCOSE      POCT Blood Glucose.: 83 mg/dL (28 Dec 2022 10:58)  POCT Blood Glucose.: 69 mg/dL (28 Dec 2022 08:54)      PHYSICAL EXAM  General: NAD  Head: NC/AT; MMM; PERRL; EOMI;  Neck: Supple; no JVD  Respiratory: CTAB; no wheezes/rales/rhonchi  Cardiovascular: Regular rhythm/rate; S1/S2+, no murmurs, rubs gallops   Gastrointestinal: Soft; NTND; bowel sounds normal and present  Extremities: WWP; no edema/cyanosis  Neurological: A&Ox3, CNII-XII grossly intact; no obvious focal deficits    MEDICATIONS  (STANDING):  divalproex  milliGRAM(s) Oral <User Schedule>  famotidine    Tablet 20 milliGRAM(s) Oral two times a day  heparin  Infusion 800 Unit(s)/Hr (8 mL/Hr) IV Continuous <Continuous>  lactated ringers. 500 milliLiter(s) (30 mL/Hr) IV Continuous <Continuous>  PHENobarbital 16.2 milliGRAM(s) Oral every 12 hours  sodium chloride 0.9% lock flush 3 milliLiter(s) IV Push every 8 hours    MEDICATIONS  (PRN):      clindamycin (Anaphylaxis)  Dilantin (Unknown)  penicillin (Unknown)  penicillins (Flushing; Rash)      LABS                        10.1   10.98 )-----------( 204      ( 28 Dec 2022 08:48 )             29.1     12-28    131<L>  |  99  |  8   ----------------------------<  73  3.5   |  24  |  0.34<L>    Ca    7.9<L>      28 Dec 2022 08:48  Phos  3.4     12-28  Mg     1.9     12-28    TPro  4.8<L>  /  Alb  2.3<L>  /  TBili  0.4  /  DBili  x   /  AST  12  /  ALT  8<L>  /  AlkPhos  79  12-28    PT/INR - ( 28 Dec 2022 08:48 )   PT: 19.2 sec;   INR: 1.61          PTT - ( 28 Dec 2022 08:48 )  PTT:69.1 sec    CARDIAC MARKERS ( 27 Dec 2022 23:52 )  x     / 0.06 ng/mL / x     / x     / x              IMAGING/EKG/ETC   ----STEPDOWN FROM [facility] TO [facility]----    Hospital Course:  83 y/o F transfer from [facility]. Pt [facility] with pmhx dementia?,  epilepsy, GERD, diverticulitis s/p bowel resection, SVT and recent ORIF for femoral neck fx. She presented with AMS and acute hypoxic respiratory failure. Patient admitted to ICU with working diagnosis of septic shock due to presumed pneumonia, s/p vancomycin and aztreonam (reported PCN and clindamycin allergy). The patient had a persistent low dose vasopressor requirement. The following morning she developed new-onset Afib with RVR and increasing hypoxia, which responded to amio bolus. She later underwent CTA PE and was found to have extensive bilateral PEs with evidence of right heart strain. There was also some report of a left femoral DVT. TTE showed preserved LVEF 65%/ RA dilation with septal flattening/ mild Pulm HTN PASP 46. Ultimately decision made to administer 1/2 dose tPA. Her pressors were changed from levophed to phenylephrine and dobutamine Transferred  to [facility] on heparin drip on 4L NC stable. Admitted to CT surgery, Dr. Martínez, for consideration of mechanical thrombectomy given large proximal thrombus burden. Patient was titrated off phenylephrine, dobutamine cut to half for HTN. Patient is on heparin gtt aPTT therapeutic ~ 66.    Subjective/ROS: Patient seen and examined at bedside.     Denies Fever/Chills, HA, CP, SOB, n/v, changes in bowel/urinary habits.  12pt ROS otherwise negative.    VITALS  Vital Signs Last 24 Hrs  T(C): 36.7 (28 Dec 2022 17:13), Max: 37.1 (27 Dec 2022 22:22)  T(F): 98.1 (28 Dec 2022 17:13), Max: 98.8 (27 Dec 2022 22:22)  HR: 106 (28 Dec 2022 18:00) (98 - 111)  BP: 126/67 (27 Dec 2022 23:24) (125/74 - 153/67)  BP(mean): 91 (27 Dec 2022 23:24) (90 - 94)  RR: 27 (28 Dec 2022 18:00) (15 - 33)  SpO2: 94% (28 Dec 2022 18:00) (94% - 100%)    Parameters below as of 28 Dec 2022 18:00  Patient On (Oxygen Delivery Method): nasal cannula w/ humidification  O2 Flow (L/min): 4      CAPILLARY BLOOD GLUCOSE      POCT Blood Glucose.: 83 mg/dL (28 Dec 2022 10:58)  POCT Blood Glucose.: 69 mg/dL (28 Dec 2022 08:54)      PHYSICAL EXAM  General: NAD  Head: NC/AT; MMM; PERRL; EOMI;  Neck: Supple; no JVD  Respiratory: CTAB; no wheezes/rales/rhonchi  Cardiovascular: Regular rhythm/rate; S1/S2+, no murmurs, rubs gallops   Gastrointestinal: Soft; NTND; bowel sounds normal and present  Extremities: well-perfused; no edema/cyanosis, LLE warm to touch, RLE cool on exam  Neurological: A&Ox3, CNII-XII grossly intact; no obvious focal deficits    MEDICATIONS  (STANDING):  divalproex  milliGRAM(s) Oral <User Schedule>  famotidine    Tablet 20 milliGRAM(s) Oral two times a day  heparin  Infusion 800 Unit(s)/Hr (8 mL/Hr) IV Continuous <Continuous>  lactated ringers. 500 milliLiter(s) (30 mL/Hr) IV Continuous <Continuous>  PHENobarbital 16.2 milliGRAM(s) Oral every 12 hours  sodium chloride 0.9% lock flush 3 milliLiter(s) IV Push every 8 hours    MEDICATIONS  (PRN):      clindamycin (Anaphylaxis)  Dilantin (Unknown)  penicillin (Unknown)  penicillins (Flushing; Rash)      LABS                        10.1   10.98 )-----------( 204      ( 28 Dec 2022 08:48 )             29.1     12-28    131<L>  |  99  |  8   ----------------------------<  73  3.5   |  24  |  0.34<L>    Ca    7.9<L>      28 Dec 2022 08:48  Phos  3.4     12-28  Mg     1.9     12-28    TPro  4.8<L>  /  Alb  2.3<L>  /  TBili  0.4  /  DBili  x   /  AST  12  /  ALT  8<L>  /  AlkPhos  79  12-28    PT/INR - ( 28 Dec 2022 08:48 )   PT: 19.2 sec;   INR: 1.61          PTT - ( 28 Dec 2022 08:48 )  PTT:69.1 sec    CARDIAC MARKERS ( 27 Dec 2022 23:52 )  x     / 0.06 ng/mL / x     / x     / x              IMAGING/EKG/ETC   ----STEPDOWN FROM [facility] TO [facility]----    Hospital Course:  81 y/o F transfer from [facility]. Pt [facility] with pmhx dementia?,  epilepsy, GERD, diverticulitis s/p bowel resection, SVT and recent ORIF for femoral neck fx. She presented with AMS and acute hypoxic respiratory failure. Patient admitted to ICU with working diagnosis of septic shock due to presumed pneumonia, s/p vancomycin and aztreonam (reported PCN and clindamycin allergy). The patient had a persistent low dose vasopressor requirement. The following morning she developed new-onset Afib with RVR and increasing hypoxia, which responded to amio bolus. She later underwent CTA PE and was found to have extensive bilateral PEs with evidence of right heart strain. There was also some report of a left femoral DVT. TTE showed preserved LVEF 65%/ RA dilation with septal flattening/ mild Pulm HTN PASP 46. Ultimately decision made to administer 1/2 dose tPA. Her pressors were changed from levophed to phenylephrine and dobutamine Transferred  to [facility] on heparin drip on 4L NC stable. Admitted to CT surgery, Dr. Martínez, for consideration of mechanical thrombectomy given large proximal thrombus burden. Patient was titrated off phenylephrine, dobutamine cut to half for HTN. Patient is on heparin gtt aPTT therapeutic ~ 66.    Subjective/ROS: Patient seen and examined at bedside.     Denies Fever/Chills, HA, CP, SOB, n/v, changes in bowel/urinary habits.  12pt ROS otherwise negative.    VITALS  Vital Signs Last 24 Hrs  T(C): 36.7 (28 Dec 2022 17:13), Max: 37.1 (27 Dec 2022 22:22)  T(F): 98.1 (28 Dec 2022 17:13), Max: 98.8 (27 Dec 2022 22:22)  HR: 106 (28 Dec 2022 18:00) (98 - 111)  BP: 126/67 (27 Dec 2022 23:24) (125/74 - 153/67)  BP(mean): 91 (27 Dec 2022 23:24) (90 - 94)  RR: 27 (28 Dec 2022 18:00) (15 - 33)  SpO2: 94% (28 Dec 2022 18:00) (94% - 100%)    Parameters below as of 28 Dec 2022 18:00  Patient On (Oxygen Delivery Method): nasal cannula w/ humidification  O2 Flow (L/min): 4      CAPILLARY BLOOD GLUCOSE      POCT Blood Glucose.: 83 mg/dL (28 Dec 2022 10:58)  POCT Blood Glucose.: 69 mg/dL (28 Dec 2022 08:54)      PHYSICAL EXAM  General: NAD  Head: NC/AT; MMM; PERRL; EOMI;  Neck: Supple; no JVD  Respiratory: CTAB; no wheezes/rales/rhonchi  Cardiovascular: Regular rhythm/rate; S1/S2+, no murmurs, rubs gallops   Gastrointestinal: Soft; NTND; bowel sounds normal and present  Extremities: well-perfused; no edema/cyanosis, LLE warm to touch, RLE cool on exam  Neurological: A&Ox3, CNII-XII grossly intact; no obvious focal deficits    MEDICATIONS  (STANDING):  divalproex  milliGRAM(s) Oral <User Schedule>  famotidine    Tablet 20 milliGRAM(s) Oral two times a day  heparin  Infusion 800 Unit(s)/Hr (8 mL/Hr) IV Continuous <Continuous>  lactated ringers. 500 milliLiter(s) (30 mL/Hr) IV Continuous <Continuous>  PHENobarbital 16.2 milliGRAM(s) Oral every 12 hours  sodium chloride 0.9% lock flush 3 milliLiter(s) IV Push every 8 hours    MEDICATIONS  (PRN):      clindamycin (Anaphylaxis)  Dilantin (Unknown)  penicillin (Unknown)  penicillins (Flushing; Rash)      LABS                        10.1   10.98 )-----------( 204      ( 28 Dec 2022 08:48 )             29.1     12-28    131<L>  |  99  |  8   ----------------------------<  73  3.5   |  24  |  0.34<L>    Ca    7.9<L>      28 Dec 2022 08:48  Phos  3.4     12-28  Mg     1.9     12-28    TPro  4.8<L>  /  Alb  2.3<L>  /  TBili  0.4  /  DBili  x   /  AST  12  /  ALT  8<L>  /  AlkPhos  79  12-28    PT/INR - ( 28 Dec 2022 08:48 )   PT: 19.2 sec;   INR: 1.61          PTT - ( 28 Dec 2022 08:48 )  PTT:69.1 sec    CARDIAC MARKERS ( 27 Dec 2022 23:52 )  x     / 0.06 ng/mL / x     / x     / x              IMAGING/EKG/ETC   ----STEPDOWN FROM [facility] TO [facility]----    Hospital Course:  81 y/o F transfer from [facility]. Pt [facility] with pmhx dementia?,  epilepsy, GERD, diverticulitis s/p bowel resection, SVT and recent ORIF for femoral neck fx. She presented with AMS and acute hypoxic respiratory failure. Patient admitted to ICU with working diagnosis of septic shock due to presumed pneumonia, s/p vancomycin and aztreonam (reported PCN and clindamycin allergy). The patient had a persistent low dose vasopressor requirement. The following morning she developed new-onset Afib with RVR and increasing hypoxia, which responded to amio bolus. She later underwent CTA PE and was found to have extensive bilateral PEs with evidence of right heart strain. There was also some report of a left femoral DVT. TTE showed preserved LVEF 65%/ RA dilation with septal flattening/ mild Pulm HTN PASP 46. Ultimately decision made to administer 1/2 dose tPA. Her pressors were changed from levophed to phenylephrine and dobutamine Transferred  to [facility] on heparin drip on 4L NC stable. Admitted to CT surgery, Dr. Martínez, for consideration of mechanical thrombectomy given large proximal thrombus burden. Patient was titrated off phenylephrine, dobutamine cut to half for HTN. Patient is on heparin gtt aPTT therapeutic ~ 66.    Subjective/ROS: Patient seen and examined at bedside.     Denies Fever/Chills, HA, CP, SOB, n/v, changes in bowel/urinary habits.  12pt ROS otherwise negative.    VITALS  Vital Signs Last 24 Hrs  T(C): 36.7 (28 Dec 2022 17:13), Max: 37.1 (27 Dec 2022 22:22)  T(F): 98.1 (28 Dec 2022 17:13), Max: 98.8 (27 Dec 2022 22:22)  HR: 106 (28 Dec 2022 18:00) (98 - 111)  BP: 126/67 (27 Dec 2022 23:24) (125/74 - 153/67)  BP(mean): 91 (27 Dec 2022 23:24) (90 - 94)  RR: 27 (28 Dec 2022 18:00) (15 - 33)  SpO2: 94% (28 Dec 2022 18:00) (94% - 100%)    Parameters below as of 28 Dec 2022 18:00  Patient On (Oxygen Delivery Method): nasal cannula w/ humidification  O2 Flow (L/min): 4      CAPILLARY BLOOD GLUCOSE      POCT Blood Glucose.: 83 mg/dL (28 Dec 2022 10:58)  POCT Blood Glucose.: 69 mg/dL (28 Dec 2022 08:54)      PHYSICAL EXAM  General: NAD  Head: NC/AT; MMM; PERRL; EOMI;  Neck: Supple; no JVD  Respiratory: CTAB; no wheezes/rales/rhonchi  Cardiovascular: Regular rhythm/rate; S1/S2+, no murmurs, rubs gallops   Gastrointestinal: Soft; NTND; bowel sounds normal and present  Extremities: well-perfused; no edema/cyanosis, LLE warm to touch, RLE cool on exam  Neurological: A&Ox2 to person and time    MEDICATIONS  (STANDING):  divalproex  milliGRAM(s) Oral <User Schedule>  famotidine    Tablet 20 milliGRAM(s) Oral two times a day  heparin  Infusion 800 Unit(s)/Hr (8 mL/Hr) IV Continuous <Continuous>  lactated ringers. 500 milliLiter(s) (30 mL/Hr) IV Continuous <Continuous>  PHENobarbital 16.2 milliGRAM(s) Oral every 12 hours  sodium chloride 0.9% lock flush 3 milliLiter(s) IV Push every 8 hours    MEDICATIONS  (PRN):      clindamycin (Anaphylaxis)  Dilantin (Unknown)  penicillin (Unknown)  penicillins (Flushing; Rash)      LABS                        10.1   10.98 )-----------( 204      ( 28 Dec 2022 08:48 )             29.1     12-28    131<L>  |  99  |  8   ----------------------------<  73  3.5   |  24  |  0.34<L>    Ca    7.9<L>      28 Dec 2022 08:48  Phos  3.4     12-28  Mg     1.9     12-28    TPro  4.8<L>  /  Alb  2.3<L>  /  TBili  0.4  /  DBili  x   /  AST  12  /  ALT  8<L>  /  AlkPhos  79  12-28    PT/INR - ( 28 Dec 2022 08:48 )   PT: 19.2 sec;   INR: 1.61          PTT - ( 28 Dec 2022 08:48 )  PTT:69.1 sec    CARDIAC MARKERS ( 27 Dec 2022 23:52 )  x     / 0.06 ng/mL / x     / x     / x              IMAGING/EKG/ETC   ----STEPDOWN FROM [facility] TO [facility]----    Hospital Course:    81 y/o F transfer from [facility]: Pt initially presented to [facility] from [facility] with pmhx dementia,  epilepsy, GERD, diverticulitis s/p bowel resection, SVT and recent ORIF for femoral neck fx. She presented with AMS and acute hypoxic respiratory failure. Patient admitted to ICU with working diagnosis of septic shock due to presumed pneumonia, s/p vancomycin and aztreonam (reported PCN and clindamycin allergy). The patient had a persistent low dose vasopressor requirement. The following morning she developed new-onset Afib with RVR and increasing hypoxia, which responded to amiodarone bolus. She later underwent CTA PE and was found to have extensive bilateral PEs with evidence of right heart strain. LE venous ultrasound confirmed the presence of a left femoral DVT. TTE obtained showed preserved LVEF 65%/ RA dilation with septal flattening/ mild Pulm HTN PASP 46. Ultimately decision made to administer 1/2 dose tPA. Her pressors were changed from levophed to phenylephrine and dobutamine. Patient was transferred to [facility] on heparin drip and 4L NC stable, admitted to CT surgery, Dr. Martínez, for consideration of mechanical thrombectomy given large proximal thrombus burden. Patient was titrated off phenylephrine and dobutamine. Patient not a good candidate for thrombectomy, stepped down to [facility] for medical management of b/l PE’s and DVT. Patient currently on heparin gtt aPTT therapeutic ~ 66.      Subjective/ROS: Patient seen and examined at bedside.     Denies Fever/Chills, HA, CP, SOB, n/v, changes in bowel/urinary habits.  12pt ROS otherwise negative.    VITALS  Vital Signs Last 24 Hrs  T(C): 36.7 (28 Dec 2022 17:13), Max: 37.1 (27 Dec 2022 22:22)  T(F): 98.1 (28 Dec 2022 17:13), Max: 98.8 (27 Dec 2022 22:22)  HR: 106 (28 Dec 2022 18:00) (98 - 111)  BP: 126/67 (27 Dec 2022 23:24) (125/74 - 153/67)  BP(mean): 91 (27 Dec 2022 23:24) (90 - 94)  RR: 27 (28 Dec 2022 18:00) (15 - 33)  SpO2: 94% (28 Dec 2022 18:00) (94% - 100%)    Parameters below as of 28 Dec 2022 18:00  Patient On (Oxygen Delivery Method): nasal cannula w/ humidification  O2 Flow (L/min): 4      CAPILLARY BLOOD GLUCOSE      POCT Blood Glucose.: 83 mg/dL (28 Dec 2022 10:58)  POCT Blood Glucose.: 69 mg/dL (28 Dec 2022 08:54)      PHYSICAL EXAM  General: NAD  Head: NC/AT; MMM; PERRL; EOMI;  Neck: Supple; no JVD  Respiratory: CTAB; no wheezes/rales/rhonchi  Cardiovascular: Regular rhythm/rate; S1/S2+, no murmurs, rubs gallops   Gastrointestinal: Soft; NTND; bowel sounds normal and present  Extremities: well-perfused; no edema/cyanosis, LLE warm to touch, RLE cool on exam  Neurological: A&Ox2 to person and time    MEDICATIONS  (STANDING):  divalproex  milliGRAM(s) Oral <User Schedule>  famotidine    Tablet 20 milliGRAM(s) Oral two times a day  heparin  Infusion 800 Unit(s)/Hr (8 mL/Hr) IV Continuous <Continuous>  lactated ringers. 500 milliLiter(s) (30 mL/Hr) IV Continuous <Continuous>  PHENobarbital 16.2 milliGRAM(s) Oral every 12 hours  sodium chloride 0.9% lock flush 3 milliLiter(s) IV Push every 8 hours    MEDICATIONS  (PRN):      clindamycin (Anaphylaxis)  Dilantin (Unknown)  penicillin (Unknown)  penicillins (Flushing; Rash)      LABS                        10.1   10.98 )-----------( 204      ( 28 Dec 2022 08:48 )             29.1     12-28    131<L>  |  99  |  8   ----------------------------<  73  3.5   |  24  |  0.34<L>    Ca    7.9<L>      28 Dec 2022 08:48  Phos  3.4     12-28  Mg     1.9     12-28    TPro  4.8<L>  /  Alb  2.3<L>  /  TBili  0.4  /  DBili  x   /  AST  12  /  ALT  8<L>  /  AlkPhos  79  12-28    PT/INR - ( 28 Dec 2022 08:48 )   PT: 19.2 sec;   INR: 1.61          PTT - ( 28 Dec 2022 08:48 )  PTT:69.1 sec    CARDIAC MARKERS ( 27 Dec 2022 23:52 )  x     / 0.06 ng/mL / x     / x     / x              IMAGING/EKG/ETC

## 2022-12-28 NOTE — DIETITIAN NUTRITION RISK NOTIFICATION - ADDITIONAL COMMENTS/DIETITIAN RECOMMENDATIONS
1. Recommend liberalize diet to regular diet  --Recommend add Ensure enlive BID (+350kcal, 20g protein)

## 2022-12-28 NOTE — PROGRESS NOTE ADULT - ASSESSMENT
82 F resident of [facility], initially admitted at [facility] 12/25 for hypoxia attributed to pneumonia with UTI, also found to have bilateral massive PE associated with hemodynamic compromise and shock requiring initiation of phenylephrine and dobutamine. Transferred to [facility] for consideration of mechanical thrombectomy given large proximal thrombus burden.   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 this excludes time spent on procedures or teaching.        82 F resident of [facility], initially admitted at [facility] 12/25 for hypoxia attributed to pneumonia with UTI, also found to have bilateral massive PE associated with hemodynamic compromise and shock requiring initiation of phenylephrine and dobutamine. Transferred to [facility] for consideration of mechanical thrombectomy given large proximal thrombus burden.   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 this excludes time spent on procedures or teaching.        82 F resident of [facility], initially admitted at [facility] 12/25 for hypoxia attributed to pneumonia with UTI, also found to have bilateral massive PE associated with hemodynamic compromise and shock requiring initiation of phenylephrine and dobutamine. Transferred to [facility] for consideration of mechanical thrombectomy given large proximal thrombus burden.   Interval course:  Empirically treated with vancomycin and aztreonam (reported PCN and clindamycin allergy)  Left femoral DVT  CTPE with bilateral main PA thrombus extending distally  TTE: preserved LV fx EF 65%/ RA dilation with septal flattening/ mild Pulm HTN PASP 46   S/p half dose sys tPA  12/26  Afib RVR responded to amio bolus 12/27 in OSH--currently NSR     ASSESSMENT/PLAN  Received alert and awake, no respiratory distress satting in high 90s on 4 L NC and warm on exam   POC Echo w/dilated RA, mild flattening of septum, preserved LV fx, hyperemic IVC w/good resp variation   phenylephrine titrated off and dobutamine cut to half for hypertension, JVD flat with no edema   Normal kidney fx and transaminitis, lactate pending   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 this excludes time spent on procedures or teaching.

## 2022-12-28 NOTE — PROGRESS NOTE ADULT - PROBLEM SELECTOR PLAN 4
F:  E:  N:  DVT ppx:  GI ppx: F: LR 500cc, running at 30cc/hr  E: replete as needed   N: DASH diet   DVT ppx: heparin gtt  GI ppx: - will have to call [facility] in AM for collateral  - patient examined at bedside AAOx2 to person and time. on home meds: depakote 500mg BID and phenobarbital 16.2mg BID  - c/w home meds

## 2022-12-29 LAB
ALBUMIN SERPL ELPH-MCNC: 2.4 G/DL — LOW (ref 3.3–5)
ALP SERPL-CCNC: 90 U/L — SIGNIFICANT CHANGE UP (ref 40–120)
ALT FLD-CCNC: 6 U/L — LOW (ref 10–45)
ANION GAP SERPL CALC-SCNC: 11 MMOL/L — SIGNIFICANT CHANGE UP (ref 5–17)
APTT BLD: 49.6 SEC — HIGH (ref 27.5–35.5)
AST SERPL-CCNC: 12 U/L — SIGNIFICANT CHANGE UP (ref 10–40)
BASOPHILS # BLD AUTO: 0.02 K/UL — SIGNIFICANT CHANGE UP (ref 0–0.2)
BASOPHILS NFR BLD AUTO: 0.3 % — SIGNIFICANT CHANGE UP (ref 0–2)
BILIRUB SERPL-MCNC: 0.4 MG/DL — SIGNIFICANT CHANGE UP (ref 0.2–1.2)
BUN SERPL-MCNC: 6 MG/DL — LOW (ref 7–23)
CALCIUM SERPL-MCNC: 8.7 MG/DL — SIGNIFICANT CHANGE UP (ref 8.4–10.5)
CHLORIDE SERPL-SCNC: 94 MMOL/L — LOW (ref 96–108)
CO2 SERPL-SCNC: 25 MMOL/L — SIGNIFICANT CHANGE UP (ref 22–31)
CREAT SERPL-MCNC: 0.41 MG/DL — LOW (ref 0.5–1.3)
EGFR: 97 ML/MIN/1.73M2 — SIGNIFICANT CHANGE UP
EOSINOPHIL # BLD AUTO: 0.16 K/UL — SIGNIFICANT CHANGE UP (ref 0–0.5)
EOSINOPHIL NFR BLD AUTO: 2.4 % — SIGNIFICANT CHANGE UP (ref 0–6)
GLUCOSE SERPL-MCNC: 69 MG/DL — LOW (ref 70–99)
HCT VFR BLD CALC: 32.6 % — LOW (ref 34.5–45)
HGB BLD-MCNC: 11.3 G/DL — LOW (ref 11.5–15.5)
IMM GRANULOCYTES NFR BLD AUTO: 1.5 % — HIGH (ref 0–0.9)
INR BLD: 1.16 — SIGNIFICANT CHANGE UP (ref 0.88–1.16)
LYMPHOCYTES # BLD AUTO: 0.98 K/UL — LOW (ref 1–3.3)
LYMPHOCYTES # BLD AUTO: 14.5 % — SIGNIFICANT CHANGE UP (ref 13–44)
MAGNESIUM SERPL-MCNC: 1.9 MG/DL — SIGNIFICANT CHANGE UP (ref 1.6–2.6)
MCHC RBC-ENTMCNC: 31.5 PG — SIGNIFICANT CHANGE UP (ref 27–34)
MCHC RBC-ENTMCNC: 34.7 GM/DL — SIGNIFICANT CHANGE UP (ref 32–36)
MCV RBC AUTO: 90.8 FL — SIGNIFICANT CHANGE UP (ref 80–100)
MONOCYTES # BLD AUTO: 0.66 K/UL — SIGNIFICANT CHANGE UP (ref 0–0.9)
MONOCYTES NFR BLD AUTO: 9.7 % — SIGNIFICANT CHANGE UP (ref 2–14)
NEUTROPHILS # BLD AUTO: 4.86 K/UL — SIGNIFICANT CHANGE UP (ref 1.8–7.4)
NEUTROPHILS NFR BLD AUTO: 71.6 % — SIGNIFICANT CHANGE UP (ref 43–77)
NRBC # BLD: 0 /100 WBCS — SIGNIFICANT CHANGE UP (ref 0–0)
PHOSPHATE SERPL-MCNC: 3.2 MG/DL — SIGNIFICANT CHANGE UP (ref 2.5–4.5)
PLATELET # BLD AUTO: 233 K/UL — SIGNIFICANT CHANGE UP (ref 150–400)
POTASSIUM SERPL-MCNC: 4 MMOL/L — SIGNIFICANT CHANGE UP (ref 3.5–5.3)
POTASSIUM SERPL-SCNC: 4 MMOL/L — SIGNIFICANT CHANGE UP (ref 3.5–5.3)
PROT SERPL-MCNC: 5.9 G/DL — LOW (ref 6–8.3)
PROTHROM AB SERPL-ACNC: 13.8 SEC — HIGH (ref 10.5–13.4)
RBC # BLD: 3.59 M/UL — LOW (ref 3.8–5.2)
RBC # FLD: 14.9 % — HIGH (ref 10.3–14.5)
SODIUM SERPL-SCNC: 130 MMOL/L — LOW (ref 135–145)
WBC # BLD: 6.78 K/UL — SIGNIFICANT CHANGE UP (ref 3.8–10.5)
WBC # FLD AUTO: 6.78 K/UL — SIGNIFICANT CHANGE UP (ref 3.8–10.5)

## 2022-12-29 PROCEDURE — 99233 SBSQ HOSP IP/OBS HIGH 50: CPT | Mod: GC

## 2022-12-29 PROCEDURE — 71045 X-RAY EXAM CHEST 1 VIEW: CPT | Mod: 26

## 2022-12-29 RX ORDER — APIXABAN 2.5 MG/1
10 TABLET, FILM COATED ORAL EVERY 12 HOURS
Refills: 0 | Status: DISCONTINUED | OUTPATIENT
Start: 2022-12-29 | End: 2022-12-30

## 2022-12-29 RX ORDER — ENOXAPARIN SODIUM 100 MG/ML
50 INJECTION SUBCUTANEOUS EVERY 12 HOURS
Refills: 0 | Status: DISCONTINUED | OUTPATIENT
Start: 2022-12-29 | End: 2022-12-29

## 2022-12-29 RX ADMIN — SODIUM CHLORIDE 3 MILLILITER(S): 9 INJECTION INTRAMUSCULAR; INTRAVENOUS; SUBCUTANEOUS at 12:16

## 2022-12-29 RX ADMIN — FAMOTIDINE 20 MILLIGRAM(S): 10 INJECTION INTRAVENOUS at 17:12

## 2022-12-29 RX ADMIN — FAMOTIDINE 20 MILLIGRAM(S): 10 INJECTION INTRAVENOUS at 06:32

## 2022-12-29 RX ADMIN — Medication 16.2 MILLIGRAM(S): at 12:13

## 2022-12-29 RX ADMIN — SODIUM CHLORIDE 3 MILLILITER(S): 9 INJECTION INTRAMUSCULAR; INTRAVENOUS; SUBCUTANEOUS at 06:31

## 2022-12-29 RX ADMIN — Medication 16.2 MILLIGRAM(S): at 00:22

## 2022-12-29 RX ADMIN — SODIUM CHLORIDE 3 MILLILITER(S): 9 INJECTION INTRAMUSCULAR; INTRAVENOUS; SUBCUTANEOUS at 23:16

## 2022-12-29 RX ADMIN — APIXABAN 10 MILLIGRAM(S): 2.5 TABLET, FILM COATED ORAL at 09:38

## 2022-12-29 RX ADMIN — APIXABAN 10 MILLIGRAM(S): 2.5 TABLET, FILM COATED ORAL at 23:06

## 2022-12-29 RX ADMIN — DIVALPROEX SODIUM 500 MILLIGRAM(S): 500 TABLET, DELAYED RELEASE ORAL at 09:28

## 2022-12-29 NOTE — PROGRESS NOTE ADULT - ASSESSMENT
81 y/o F transfer from [facility] transferred to [facility] CT surgery for massive b/l PE and LLE DVT, stepped down to [facility] for medical management.

## 2022-12-29 NOTE — PROGRESS NOTE ADULT - PROBLEM SELECTOR PLAN 5
- will have to call [facility] in AM for collateral  - patient examined at bedside AAOx2 to person and time. Per sister, patient (and another sister) reside at NH.

## 2022-12-29 NOTE — PROGRESS NOTE ADULT - PROBLEM SELECTOR PLAN 5
- will have to call [facility] in AM for collateral  - patient examined at bedside AAOx2 to person and time. Per sister, patient (and another sister) reside at NH.

## 2022-12-29 NOTE — DISCHARGE NOTE PROVIDER - NSDCQMSTAIRS_GEN_ALL_CORE
[facility]  Surgery Department  Operative Note    SUMMARY     Date of Procedure: 9/11/2020     Procedure:   1.  Intraoperative ultrasound interpretation done by me  2.  Intraoperative fluoroscopy interpretation done by me less than 1 our  3.  Attempted right-sided PermCath, left side internal jugular tunneled PermCath placement     Surgeon(s) and Role:     * William Beltran MD - Primary    Assisting Surgeon: Randall Segovia    Pre-Operative Diagnosis: End stage renal disease [N18.6]    Post-Operative Diagnosis: Post-Op Diagnosis Codes:     * End stage renal disease [N18.6]    Anesthesia: Choice    Hist ory and indications:  This this is a 54-year-old female who has end-stage renal disease has a nonfunctioning are poorly functioning PermCath.  She has not had dialysis properly in the last couple of days she was admitted yesterday.  She was scheduled for a PermCath placement this morning.  The all the options were discussed and informed consent was obtained.  Patient also was interested in peritoneal dialysis since she requires a proper dialysis the plan was to do hemodialysis today and plan for peritoneal dialysis later on.    Procedure:  Patient was brought to the operating room and was placed in supine position.  She was given sedation.  The right side of the neck chest wall left side of the neck and chest wall was prepped and draped in the usual sterile manner.  Time-out was done to verify the ID of the patient and procedure and everyone concurred.  Ultrasound of the right neck was done the internal jugular was found to be patent.  1% xylocaine was infiltrated and internal jugular access was accomplished.  There was difficulty in threading the guidewire.  After few attempts a decision was about mid to about the right neck.    Patient already had a left subclavian PermCath which was causing left upper arm edema therefore decision was made to proceed with left internal  jugular approach.  Ultrasound was done internal jugular vein was open and was compressible.  Using a 14 gauge needle access was accomplished through the needle a guidewire was inserted and the guidewire was advanced under fluoroscopic guidance.    An exit site was chosen.  The catheter proximal tip was brought through the tunnel.  With fluoroscopic guidance series of dilators were passed over the guidewire and the largest dilator with the sheath was advanced over the guidewire.  The dilator and the guidewire was pulled out tried to advance the proximal tip of the catheter however there was significant resistance.  Few attempts were made to negotiate the catheter beyond the brachiocephalic SVC junction.    Finally a slide wire was inserted through the catheter and advanced and the position was verified.  The the guidewire was in the right ventricle.  Carefully and or appeared of time the proximal tip of the catheter was gradually advanced such that the tip was at the junction of right atrium and right ventricle.  The slide wire was then pulled out the blood was withdrawn through all the ports and was flushed with heparin saline followed by straight heparin.  Again fluoroscopy was done to check the contour of the catheter and the the position and was found to be satisfactory.  Two nylon was used to secure the catheter to the skin.  Patient tolerated the procedure well a stat chest x-ray will be obtained    Complications: no    Estimated Blood Loss (EBL): minimal         Implants:   Implant Name Type Inv. Item Serial No.  Lot No. LRB No. Used Action   CATH DIALYSIS [id number] - [id number]  CATH DIALYSIS [id number]  [employer] [id number] Left 1 Implanted       Specimens:   none            Condition: Stable    Disposition: PACU - hemodynamically stable.       Yes

## 2022-12-29 NOTE — PROGRESS NOTE ADULT - PROBLEM SELECTOR PLAN 3
Patient with new-onset AFib w RVR at OSH s/p amio bolus converted to NSR.  - continue to monitor  - can consider eliquis Patient with new-onset AFib w RVR at OSH s/p amio bolus converted to NSR.  - continue to monitor  - Started eliquis 10mg q12

## 2022-12-29 NOTE — PHYSICAL THERAPY INITIAL EVALUATION ADULT - ADDITIONAL COMMENTS
Pt is admitted from NH and Pt is AOx1 and a poor historian. Unable to obtain PLOF from Pt at the moment, Pt is admitted from [place] and Pt is AOx1 and a poor historian. Unable to obtain PLOF from Pt at the moment,

## 2022-12-29 NOTE — CONSULT NOTE ADULT - SUBJECTIVE AND OBJECTIVE BOX
Patient is a 82y old  Female who presents with a chief complaint of PE (29 Dec 2022 07:07)        HPI:  83 y/o F transfer from [facility] from [facility] with pmhx dementia?,  epilepsy, GERD, diverticulitis s/p bowel resection, SVT and recent ORIF for femoral neck fx who presents from NH with AMS acute hypoxic respiratory failure. Patient admitted to ICU with working diagnosis of septic shock due to presume pneumonia. The patient has had a persistent low dose vasopressor requirement. This am the patient developed new onset Afib with RVR and increasing hypoxia. She later underwent CTA PE and was found to have extensive bilateral PEs with e/o right heart strain. Ultimately decision made to administer 1/2 dose tPA. Her pressors were changed to phenylephrine and dobutamine from levophed. Transferred on heparin drip on nasal cannula stable. Admit to Dr. Martínez for evaluation. (27 Dec 2022 23:34)      PAST MEDICAL & SURGICAL HISTORY:  Epilepsy      Femoral neck fracture      SVT (supraventricular tachycardia)      S/P ORIF (open reduction internal fixation) fracture          MEDICATIONS  (STANDING):  divalproex  milliGRAM(s) Oral <User Schedule>  enoxaparin Injectable 50 milliGRAM(s) SubCutaneous every 12 hours  famotidine    Tablet 20 milliGRAM(s) Oral two times a day  lactated ringers. 500 milliLiter(s) (30 mL/Hr) IV Continuous <Continuous>  PHENobarbital 16.2 milliGRAM(s) Oral every 12 hours  sodium chloride 0.9% lock flush 3 milliLiter(s) IV Push every 8 hours    MEDICATIONS  (PRN):           FAMILY HISTORY:    CBC Full  -  ( 29 Dec 2022 05:30 )  WBC Count : 6.78 K/uL  RBC Count : 3.59 M/uL  Hemoglobin : 11.3 g/dL  Hematocrit : 32.6 %  Platelet Count - Automated : 233 K/uL  Mean Cell Volume : 90.8 fl  Mean Cell Hemoglobin : 31.5 pg  Mean Cell Hemoglobin Concentration : 34.7 gm/dL  Auto Neutrophil # : 4.86 K/uL  Auto Lymphocyte # : 0.98 K/uL  Auto Monocyte # : 0.66 K/uL  Auto Eosinophil # : 0.16 K/uL  Auto Basophil # : 0.02 K/uL  Auto Neutrophil % : 71.6 %  Auto Lymphocyte % : 14.5 %  Auto Monocyte % : 9.7 %  Auto Eosinophil % : 2.4 %  Auto Basophil % : 0.3 %      12-28    131<L>  |  99  |  8   ----------------------------<  73  3.5   |  24  |  0.34<L>    Ca    7.9<L>      28 Dec 2022 08:48  Phos  3.4     12-28  Mg     1.9     12-28    TPro  4.8<L>  /  Alb  2.3<L>  /  TBili  0.4  /  DBili  x   /  AST  12  /  ALT  8<L>  /  AlkPhos  79  12-28            Radiology :     < from: Xray Chest 1 View- PORTABLE-Urgent (Xray Chest 1 View- PORTABLE-Urgent .) (12.28.22 @ 00:18) >  ACC: [id number] EXAM:  XR CHEST PORTABLE URGENT 1V                          PROCEDURE DATE:  12/28/2022          INTERPRETATION:  CLINICAL INDICATION:  pulmonary embolism    TECHNIQUE: Frontal chest radiograph on 12/28/2022 12:18 AM    COMPARISON: None.    FINDINGS:  Patchy right mid and lower lung zone opacities. Probable hazy left   midlung zone opacity. Small left pleural effusion. No pneumothorax. The   cardiomediastinal silhouette is within normal limits. Degenerative   changes with visualized osseous structures. No acute osseous findings.    IMPRESSION:  Patchy right mid and lower lung zone opacities with probable hazy left   midlung zone opacity, pneumonia is in the differential. Small left   pleural effusion.                Vital Signs Last 24 Hrs  T(C): 36.8 (29 Dec 2022 06:28), Max: 36.8 (29 Dec 2022 06:28)  T(F): 98.2 (29 Dec 2022 06:28), Max: 98.2 (29 Dec 2022 06:28)  HR: 80 (29 Dec 2022 04:00) (80 - 110)  BP: 152/81 (29 Dec 2022 04:00) (133/57 - 152/81)  BP(mean): 110 (29 Dec 2022 04:00) (77 - 110)  RR: 27 (28 Dec 2022 21:00) (15 - 33)  SpO2: 99% (29 Dec 2022 04:00) (94% - 100%)    Parameters below as of 29 Dec 2022 04:00  Patient On (Oxygen Delivery Method): nasal cannula  O2 Flow (L/min): 2          REVIEW OF SYSTEMS:  per hpi         Physical Exam: 82 y o woman lying comfortably in semi Walker's position , awake , NAD      Neurologic Exam:    Alert and oriented to person , speech fluent w/o dysarthria , follows commands       Cranial Nerves:     II :                         pupils equal , round and reactive to light , visual fields intact   III/ IV/VI :              extraocular movements intact , neg nystagmus , neg ptosis  V :                        facial sensation intact , V1-3 normal  VII :                      face symmetric , no droop , normal eye closure and smile  VIII :                     hearing intact to finger rub bilaterally  IX and X :             no hoarseness , gag intact , palate/ uvula rise symmetrically  XI :                       SCM / trapezius strength intact bilateral  XII :                      no tongue deviation    Motor Exam:          Right UE:               no focal weakness ,  > 3+/5 throughout                                 Left UE:                 no focal weakness,  > 3+/5 throughout           Right LE:                no focal weakness,  > 3+/5 throughout       Left LE:                  no focal weakness,  > 3+/5 throughout              Sensation:         intact to light touch x 4 extremities                         DTR :                     biceps/brachioradialis : equal                                              patella/ankle : equal       Gait :  not tested        PM&R Impression :     1) deconditioned    2) no focal weakness      Recommendations / Plan :     1) Physical / Occupational therapy focusing on therapeutic exercises , equipment evaluation , bed mobility/transfer out of bed evaluation , progressive ambulation with assistive devices prn .    2) Current disposition plan recommendation  :    return to [facility]

## 2022-12-29 NOTE — PROGRESS NOTE ADULT - ASSESSMENT
81 y/o F transfer from [facility] transferred to [facility] CT surgery for massive b/l PE and LLE DVT, stepped down to [facility] for medical management.

## 2022-12-29 NOTE — CONSULT NOTE ADULT - ASSESSMENT
per Internal Medicine    82 y o F transfer from [facility] transferred to [facility] CT surgery for massive b/l PE and LLE DVT, stepped down to [facility] for medical management.      Problem/Plan - 1:  ·  Problem: Pulmonary embolism.   ·  Plan: Patient transferred from OSH with massive b/l PE's and LLE DVT.   - full dose heparin gtt with serial PTT  - 12/28 PTT 69.1, f/u AM PTT  - echo on 12/28 showed: 1. Normal left ventricular size and systolic function.   2. Mildly dilated right ventricular size.   3. Right ventricular systolic function is reduced with apical sparing consistent with acute pulmonary embolism (Fulton's sign).   4. Mildly dilated right atrium.   5. Mild-to-moderate mitral regurgitation.   6. Mild tricuspid regurgitation.   7. Aortic sclerosis without significant stenosis.   8. Pulmonary hypertension present, pulmonary artery systolic pressure is 44 mmHg.   9. No pericardial effusion.  10. Left pleural effusion.    Problem/Plan - 2:  ·  Problem: Left femoral vein DVT.   ·  Plan: LE doppler at OSH showed L femoral DVT. LLE warm to palpation on PE.   - management as above  - avoid SCD's on LLE.    Problem/Plan - 3:  ·  Problem: Atrial fibrillation.   ·  Plan: Patient with new-onset AFib w RVR at OSH s/p amio bolus converted to NSR.  - continue to monitor  - can consider eliquis.    Problem/Plan - 4:  ·  Problem: Epilepsy.   ·  Plan: on home meds: depakote 500mg BID and phenobarbital 16.2mg BID  - c/w home meds.    Problem/Plan - 5:  ·  Problem: Dementia.   ·  Plan: - will have to call [facility] in AM for collateral  - patient examined at bedside AAOx2 to person and time.    Problem/Plan - 6:  ·  Problem: Prophylactic measure.   ·  Plan: F: LR 500cc, running at 30cc/hr  E: replete as needed   N: DASH diet   DVT ppx: heparin gtt  GI ppx:

## 2022-12-29 NOTE — DISCHARGE NOTE PROVIDER - CARE PROVIDER_API CALL
Dottie Marcelino)  Critical Care Medicine; Pulmonary Disease  [address] [place] [address]  Phone: [phone]  Fax: [phone]  Follow Up Time: 2 weeks

## 2022-12-29 NOTE — DISCHARGE NOTE PROVIDER - HOSPITAL COURSE
#Discharge:    Patient is __ yo M/F with past medical history of _____ presented with _____, found to have _____ (one liner)    Hospital course:     Patient was discharged to: (home/[facility]/acute rehab/hospice, etc, and with what services – home health PT/RN? Home O2?)    New medications:   Changes to old medications:  Medications that were stopped:    Items to follow up as outpatient:    Physical exam at the time of discharge:       #Discharge:    81 y/o F transfer from [facility] transferred to [facility] CT surgery for massive b/l PE and LLE DVT for thrombectomy, deemed not a candidate, transferred to medicine for  for medical management of PE and DVT.     Hospital course:     #Pulmonary embolism  Patient transferred from OSH with massive b/l PE's and LLE DVT. She received 1/2 tpa at OSH, was requiring pressors but now no longer on pressors. Was initially transferred over to [facility] for thrombectomy, deemed not a candidate for thrombectomy. Patient was transferred to medicine for medical management.   - discontinued full dose heparin gtt   - transitioned to eliquis 10mg BID started 12/29 9AM (initial 7 day dose)  - echo on 12/28 consistent with acute pulmonary embolism (Fulton's sign), mildly dilated right atrium, mitral and tricuspid regurgitation, pulmonary hypertension, pulmonary artery systolic pressure is 44 mmHg, and Left pleural effusion.  - patient is HDS, can be discharged to [facility] rehab for PT.    #Left femoral vein DVT.   ·  Plan: LE doppler at OSH showed L femoral DVT. LLE warm to palpation on PE.   - management as above  - avoid SCD's on LLE.    #Atrial fibrillation.   Patient with new-onset AFib w RVR at OSH s/p amio bolus converted to NSR.  - monitor     #Epilepsy.   on home meds: depakote 500mg BID and phenobarbital 16.2mg BID  - c/w home meds.    #Dementia.   ·  Plan: - will have to call Located within [facility] in AM for collateral  - patient examined at bedside AAOx2 to person and time. Per sister, patient (and another sister) reside at NH.      Patient was discharged to: (home/[facility]/acute rehab/hospice, etc, and with what services – home health PT/RN? Home O2?)    New medications: Eliquis 10mg BID (7 days initial dose)  Changes to old medications: none  Medications that were stopped: none    Items to follow up as outpatient:    Physical exam at the time of discharge:       #Discharge:    81 y/o F transfer from [facility] transferred to [facility] CT surgery for massive b/l PE and LLE DVT for thrombectomy, deemed not a candidate, transferred to medicine for  for medical management of PE and DVT.     Hospital course:     #Pulmonary embolism  Patient transferred from OSH with massive b/l PE's and LLE DVT. She received 1/2 tpa at OSH, was requiring pressors but now no longer on pressors. Was initially transferred over to [facility] for thrombectomy, deemed not a candidate for thrombectomy. Patient was transferred to medicine for medical management.   - discontinued full dose heparin gtt   - transitioned to eliquis 10mg BID started 12/29 9AM (initial 7 day dose)  - echo on 12/28 consistent with acute pulmonary embolism (Fulton's sign), mildly dilated right atrium, mitral and tricuspid regurgitation, pulmonary hypertension, pulmonary artery systolic pressure is 44 mmHg, and Left pleural effusion.    #Left femoral vein DVT  ·  Plan: LE doppler at OSH showed L femoral DVT. LLE warm to palpation on PE.   - management as above  - avoid SCD's on LLE.    #Atrial fibrillation  Patient with new-onset AFib w RVR at OSH s/p amio bolus converted to NSR.  - Remained in NSR     #Epilepsy  on home meds: depakote 500mg BID and phenobarbital 16.2mg BID  - c/w home meds.    #Dementia  ·  Plan:   - patient examined at bedside AAOx2 to person and time. Per sister, patient (and another sister) reside at NH.      Patient was discharged to: [facility]    New medications: Eliquis 10mg BID (7 days initial dose)  Changes to old medications: none  Medications that were stopped: none    Items to follow up as outpatient: None     Physical exam at the time of discharge:  General: NAD  Head: NC/AT; MMM; PERRL; EOMI;  Neck: Supple; no JVD  Respiratory: CTAB; no wheezes/rales/rhonchi  Cardiovascular: Regular rhythm/rate; S1/S2+, no murmurs, rubs gallops   Gastrointestinal: Soft; NTND; bowel sounds normal and present  Extremities: well-perfused; no edema/cyanosis, LLE warm to touch, RLE cool on exam  Neurological: A&Ox2 to person and time     #Discharge:    83 y/o F transfer from [facility] transferred to [facility] CT surgery for massive b/l PE and LLE DVT for thrombectomy, deemed not a candidate, transferred to medicine for  for medical management of PE and DVT.     Hospital course:     #Pulmonary embolism  Patient transferred from OSH with massive b/l PE's and LLE DVT. She received 1/2 tpa at OSH, was requiring pressors but now no longer on pressors. Was initially transferred over to [facility] for thrombectomy, deemed not a candidate for thrombectomy. Patient was transferred to medicine for medical management.   - discontinued full dose heparin gtt   - transitioned to eliquis 10mg BID started 12/29 9AM (initial 7 day dose)  - echo on 12/28 consistent with acute pulmonary embolism (Fulton's sign), mildly dilated right atrium, mitral and tricuspid regurgitation, pulmonary hypertension, pulmonary artery systolic pressure is 44 mmHg, and Left pleural effusion.    #Left femoral vein DVT  ·  Plan: LE doppler at OSH showed L femoral DVT. LLE warm to palpation on PE.   - management as above  - avoid SCD's on LLE.    #Atrial fibrillation  Patient with new-onset AFib w RVR at OSH s/p amio bolus converted to NSR.  - Remained in NSR     #Epilepsy  on home meds: depakote 500mg BID and phenobarbital 16.2mg BID  - c/w home meds.    #Dementia  ·  Plan:   - patient examined at bedside AAOx2 to person and time. Per sister, patient (and another sister) reside at NH.      Patient was discharged to: [facility]    New medications: Eliquis 10mg BID (7 days initial dose)  Changes to old medications: none  Medications that were stopped: none    Items to follow up as outpatient: None     Physical exam at the time of discharge:  General: NAD  Head: NC/AT; MMM; PERRL; EOMI;  Neck: Supple; no JVD  Respiratory: CTAB; no wheezes/rales/rhonchi  Cardiovascular: Regular rhythm/rate; S1/S2+, no murmurs, rubs gallops   Gastrointestinal: Soft; NTND; bowel sounds normal and present  Extremities: well-perfused; no edema/cyanosis, LLE warm to touch, RLE cool on exam  Neurological: A&Ox2 to person and time

## 2022-12-29 NOTE — PROGRESS NOTE ADULT - PROBLEM SELECTOR PLAN 6
F: none  E: replete as needed   N: DASH diet   DVT ppx: Eliquis 10mg BID  GI ppx: none
F: none  E: replete as needed   N: DASH diet   DVT ppx: Eliquis 10mg BID  GI ppx: none
F: LR 500cc, running at 30cc/hr  E: replete as needed   N: DASH diet   DVT ppx: heparin gtt  GI ppx:

## 2022-12-29 NOTE — PROGRESS NOTE ADULT - PROBLEM SELECTOR PLAN 3
Patient with new-onset AFib w RVR at OSH s/p amio bolus converted to NSR.  - continue to monitor  - can consider eliquis

## 2022-12-29 NOTE — OCCUPATIONAL THERAPY INITIAL EVALUATION ADULT - ADDITIONAL COMMENTS
Pt poor historian and unable to obtain social hx/PLOF. Per chart, pt is a long-term resident at SNF[facility] in [place], [place].

## 2022-12-29 NOTE — DISCHARGE NOTE PROVIDER - NSDCCPCAREPLAN_GEN_ALL_CORE_FT
PRINCIPAL DISCHARGE DIAGNOSIS  Diagnosis: Pulmonary embolism  Assessment and Plan of Treatment: A pulmonary embolism (PE) is a sudden blockage or decrease of blood flow in one or both lungs that happens when a clot travels into the arteries of the lung (pulmonary arteries). Most blockages come from a blood clot that forms in the vein of a leg or arm (deep vein thrombosis, DVT) and travels to the lungs. A clot is blood that has thickened into a gel or solid. PE is a dangerous and life-threatening condition that needs to be treated right away.  What are the causes?  This condition is usually caused by a blood clot that forms in a vein and moves to the lungs. In rare cases, it may be caused by air, fat, part of a tumor, or other tissue that moves through the veins and into the lungs.  How is this diagnosed?  This condition may be diagnosed based on your medical history, a physical exam, and tests. Tests may include:  Blood tests.  An ECG (electrocardiogram) of the heart.  A CT pulmonary angiogram. This test checks blood flow in and around your lungs.  A ventilation–perfusion scan, also called a lung VQ scan. This test measures air flow and blood flow to the lungs.  An ultrasound to check for a DVT.  How is this treated?  Treatment for this condition depends on many factors, such as the cause of your PE, your risk for bleeding or developing more clots, and other medical conditions you may have. Treatment aims to stop blood clots from forming or growing larger. In some cases, treatment may be aimed at breaking apart or removing the blood clot. Treatment may include:•Medicines, such as:  Blood thinning medicines, also called anticoagulants, to stop clots from forming and growing.  Medicines that break apart clots (fibrinolytics).  Procedures, such as:  Using a flexible tube to remove a blood clot (embolectomy) or to deliver medicine to destroy it (catheter-directed thrombolysis).  Surgery to remove the clot (surgical embolectomy).

## 2022-12-29 NOTE — PROGRESS NOTE ADULT - PROBLEM SELECTOR PLAN 2
LE doppler at OSH showed L femoral DVT. LLE warm to palpation on PE.   - management as above  - avoid SCD's on LLE

## 2022-12-29 NOTE — PROGRESS NOTE ADULT - PROBLEM SELECTOR PLAN 1
Patient transferred from OSH with massive b/l PE's and LLE DVT. She received 1/2 tpa at OSH, was requiring pressors but now no longer on pressors. Was initially transferred over to [facility] for thrombectomy, deemed not a candidate for thrombectomy. Patient was transferred to medicine for medical management.   - discontinued full dose heparin gtt   - transitioned to eliquis 10mg BID started 12/29 9AM (initial 7 day dose)  - echo on 12/28 consistent with acute pulmonary embolism (Fulton's sign), mildly dilated right atrium, mitral and tricuspid regurgitation, pulmonary hypertension, pulmonary artery systolic pressure is 44 mmHg, and Left pleural effusion.  - patient is HDS, can be discharged to [facility] rehab for PT

## 2022-12-29 NOTE — PHYSICAL THERAPY INITIAL EVALUATION ADULT - PERTINENT HX OF CURRENT PROBLEM, REHAB EVAL
81 [facility] F [facility] dementia?,  epilepsy, GERD, diverticulitis s/p bowel resection, SVT and recent ORIF for femoral neck fx who presents from NH with AMS acute hypoxic respiratory failure. Patient admitted to ICU with working diagnosis of septic shock due to presume pneumonia. The patient has had a persistent low dose vasopressor requirement. This am the patient developed new onset Afib with RVR and increasing hypoxia. She later underwent CTA PE and was found to have extensive bilateral PEs with e/o right heart strain. Ultimately decision made to administer 1/2 dose tPA. Her pressors were changed to phenylephrine and dobutamine from levophed. Transferred on heparin drip on nasal cannula stable. Admit to Dr. Martínez for evaluation.

## 2022-12-29 NOTE — OCCUPATIONAL THERAPY INITIAL EVALUATION ADULT - PERTINENT HX OF CURRENT PROBLEM, REHAB EVAL
83 y/o F transfer from [facility]: [facility] pmhx dementia,  epilepsy, GERD, diverticulitis s/p bowel resection, SVT and recent ORIF for femoral neck fx. She presented with AMS and acute hypoxic respiratory failure. Patient admitted to ICU with working diagnosis of septic shock due to presumed pneumonia, s/p vancomycin and aztreonam (reported PCN and clindamycin allergy). The patient had a persistent low dose vasopressor requirement. The following morning she developed new-onset Afib with RVR and increasing hypoxia, which responded to amiodarone bolus. She later underwent CTA PE and was found to have extensive bilateral PEs with evidence of right heart strain. LE venous ultrasound confirmed the presence of a left femoral DVT. TTE obtained showed preserved LVEF 65%/ RA dilation with septal flattening/ mild Pulm HTN PASP 46. Ultimately decision made to administer 1/2 dose tPA. Her pressors were changed from levophed to phenylephrine and dobutamine. Patient was transferred to [facility] on heparin drip and 4L NC stable, admitted to CT surgery, Dr. Martínez, for consideration of mechanical thrombectomy given large proximal thrombus burden. Patient was titrated off phenylephrine and dobutamine. Patient not a good candidate for thrombectomy, stepped down to [facility] for medical management of b/l PE’s and LLE femoral vein DVT. Patient was on heparin gtt transitioned to eliquis 10mg BID starting 12/29 9AM. Patient stepped down to [facility].

## 2022-12-29 NOTE — OCCUPATIONAL THERAPY INITIAL EVALUATION ADULT - GENERAL OBSERVATIONS, REHAB EVAL
OT IE completed. Orders received, chart reviewed, pt cleared for OT by VICKIE Heath. Pt received semi supine in bed, NAD, +heplock, +O2 2L NC. Pt A&Ox1, agreeable to OT, and tolerated session well.

## 2022-12-29 NOTE — OCCUPATIONAL THERAPY INITIAL EVALUATION ADULT - LEVEL OF INDEPENDENCE: SIT/STAND, REHAB EVAL
Deferred as pt demo poor postural control/sitting balance at EOB, as well as several attempts to impulsively return to supine position in bed/unable to perform

## 2022-12-29 NOTE — DISCHARGE NOTE PROVIDER - DETAILS OF MALNUTRITION DIAGNOSIS/DIAGNOSES
This patient has been assessed with a concern for Malnutrition and was treated during this hospitalization for the following Nutrition diagnosis/diagnoses:     -  12/28/2022: Severe protein-calorie malnutrition

## 2022-12-29 NOTE — PROGRESS NOTE ADULT - PROBLEM SELECTOR PLAN 1
Patient transferred from OSH with massive b/l PE's and LLE DVT. She received 1/2 tpa at OSH, was requiring pressors but now no longer on pressors. Was initially transferred over to [facility] for thrombectomy, deemed not a candidate for thrombectomy. Patient was transferred to medicine for medical management.   - discontinued full dose heparin gtt   - transitioned to eliquis 10mg BID started 12/29 9AM (initial 7 day dose)  - echo on 12/28 consistent with acute pulmonary embolism (Fulton's sign), mildly dilated right atrium, mitral and tricuspid regurgitation, pulmonary hypertension, pulmonary artery systolic pressure is 44 mmHg, and Left pleural effusion.  - patient is HDS, can be discharged to [facility] rehab for PT

## 2022-12-29 NOTE — DISCHARGE NOTE PROVIDER - NSDCMRMEDTOKEN_GEN_ALL_CORE_FT
apixaban 5 mg oral tablet: 2 tab(s) orally every 12 hours  For a total of 10 doses (5 days)  divalproex sodium 500 mg oral tablet, extended release: 1 tab(s) orally 2 times a day, at 9:15 and 20:15   Eliquis 5 mg oral tablet: 1 tab(s) orally 2 times a day  famotidine 20 mg oral tablet: 1 tab(s) orally 2 times a day  PHENobarbital 16.2 mg oral tablet: 1 tab(s) orally every 12 hours

## 2022-12-29 NOTE — PROGRESS NOTE ADULT - SUBJECTIVE AND OBJECTIVE BOX
----STEPDOWN FROM [facility] TO [facility]----    Hospital Course:    81 y/o F transfer from [facility]: Pt initially presented to [facility] from [facility] with pmhx dementia,  epilepsy, GERD, diverticulitis s/p bowel resection, SVT and recent ORIF for femoral neck fx. She presented with AMS and acute hypoxic respiratory failure. Patient admitted to ICU with working diagnosis of septic shock due to presumed pneumonia, s/p vancomycin and aztreonam (reported PCN and clindamycin allergy). The patient had a persistent low dose vasopressor requirement. The following morning she developed new-onset Afib with RVR and increasing hypoxia, which responded to amiodarone bolus. She later underwent CTA PE and was found to have extensive bilateral PEs with evidence of right heart strain. LE venous ultrasound confirmed the presence of a left femoral DVT. TTE obtained showed preserved LVEF 65%/ RA dilation with septal flattening/ mild Pulm HTN PASP 46. Ultimately decision made to administer 1/2 dose tPA. Her pressors were changed from levophed to phenylephrine and dobutamine. Patient was transferred to [facility] on heparin drip and 4L NC stable, admitted to CT surgery, Dr. Martínez, for consideration of mechanical thrombectomy given large proximal thrombus burden. Patient was titrated off phenylephrine and dobutamine. Patient not a good candidate for thrombectomy, stepped down to [facility] for medical management of b/l PE’s and LLE femoral vein DVT. Patient was on heparin gtt transitioned to eliquis 10mg BID starting 12/29 9AM. Patient stepped down to [facility].     O/N Events: kell    Subjective/ROS: Patient seen and examined at bedside.     Denies Fever/Chills, HA, CP, SOB, n/v, changes in bowel/urinary habits.  12pt ROS otherwise negative.    VITALS  Vital Signs Last 24 Hrs  T(C): 36.6 (29 Dec 2022 11:04), Max: 36.8 (29 Dec 2022 06:28)  T(F): 97.8 (29 Dec 2022 11:04), Max: 98.2 (29 Dec 2022 06:28)  HR: 96 (29 Dec 2022 08:35) (80 - 110)  BP: 117/62 (29 Dec 2022 08:35) (117/62 - 152/81)  BP(mean): 84 (29 Dec 2022 08:35) (77 - 110)  RR: 18 (29 Dec 2022 08:35) (18 - 33)  SpO2: 98% (29 Dec 2022 08:35) (94% - 100%)    Parameters below as of 29 Dec 2022 08:35  Patient On (Oxygen Delivery Method): nasal cannula  O2 Flow (L/min): 2    CAPILLARY BLOOD GLUCOSE    PHYSICAL EXAM  General: NAD  Head: NC/AT; MMM  Neck: Supple  Respiratory: CTAB; slightly tachypneic  Cardiovascular: Regular rhythm/rate; S1/S2+  Gastrointestinal: Soft; NTND; bowel sounds normal and present  Extremities: well-perfused; no edema/cyanosis, LLE warm to touch, RLE cool on exam  Neurological: A&Ox2 to person and time    MEDICATIONS  (STANDING):  apixaban 10 milliGRAM(s) Oral every 12 hours  divalproex  milliGRAM(s) Oral <User Schedule>  famotidine    Tablet 20 milliGRAM(s) Oral two times a day  lactated ringers. 500 milliLiter(s) (30 mL/Hr) IV Continuous <Continuous>  PHENobarbital 16.2 milliGRAM(s) Oral every 12 hours  sodium chloride 0.9% lock flush 3 milliLiter(s) IV Push every 8 hours    MEDICATIONS  (PRN):    clindamycin (Anaphylaxis)  Dilantin (Unknown)  penicillin (Unknown)  penicillins (Flushing; Rash)      LABS                        11.3   6.78  )-----------( 233      ( 29 Dec 2022 05:30 )             32.6     12-29    130<L>  |  94<L>  |  6<L>  ----------------------------<  69<L>  4.0   |  25  |  0.41<L>    Ca    8.7      29 Dec 2022 05:30  Phos  3.2     12-29  Mg     1.9     12-29    TPro  5.9<L>  /  Alb  2.4<L>  /  TBili  0.4  /  DBili  x   /  AST  12  /  ALT  6<L>  /  AlkPhos  90  12-29    PT/INR - ( 29 Dec 2022 05:30 )   PT: 13.8 sec;   INR: 1.16          PTT - ( 29 Dec 2022 05:30 )  PTT:49.6 sec    CARDIAC MARKERS ( 27 Dec 2022 23:52 )  x     / 0.06 ng/mL / x     / x     / x          IMAGING/EKG/ETC

## 2022-12-29 NOTE — PROGRESS NOTE ADULT - SUBJECTIVE AND OBJECTIVE BOX
***Acceptance note from [facility] to [facility]***  81 y/o F transfer from [facility]: Pt initially presented to [facility] with pmhx dementia,  epilepsy, GERD, diverticulitis s/p bowel resection, SVT and recent ORIF for femoral neck fx. She presented with AMS and acute hypoxic respiratory failure. Patient admitted to ICU with working diagnosis of septic shock due to presumed pneumonia, s/p vancomycin and aztreonam (reported PCN and clindamycin allergy). The patient had a persistent low dose vasopressor requirement. The following morning she developed new-onset Afib with RVR and increasing hypoxia, which responded to amiodarone bolus. She later underwent CTA PE and was found to have extensive bilateral PEs with evidence of right heart strain. LE venous ultrasound confirmed the presence of a left femoral DVT. TTE obtained showed preserved LVEF 65%/ RA dilation with septal flattening/ mild Pulm HTN PASP 46. Ultimately decision made to administer 1/2 dose tPA. Her pressors were changed from levophed to phenylephrine and dobutamine. Patient was transferred to [facility] on heparin drip and 4L NC stable, admitted to CT surgery, Dr. Martínez, for consideration of mechanical thrombectomy given large proximal thrombus burden. Patient was titrated off phenylephrine and dobutamine. Patient not a good candidate for thrombectomy, stepped down to [facility] for medical management of b/l PE’s and LLE femoral vein DVT. Patient was on heparin gtt transitioned to eliquis 10mg BID starting 12/29 9AM. Patient stepped down to [facility].     OVERNIGHT EVENTS: EVERARDO    SUBJECTIVE / INTERVAL HPI: Patient seen and examined at bedside.     VITAL SIGNS:  Vital Signs Last 24 Hrs  T(C): 36.6 (29 Dec 2022 11:04), Max: 36.8 (29 Dec 2022 06:28)  T(F): 97.8 (29 Dec 2022 11:04), Max: 98.2 (29 Dec 2022 06:28)  HR: 92 (29 Dec 2022 12:16) (80 - 110)  BP: 112/54 (29 Dec 2022 12:16) (112/54 - 152/81)  BP(mean): 78 (29 Dec 2022 12:16) (77 - 110)  RR: 18 (29 Dec 2022 12:16) (18 - 33)  SpO2: 97% (29 Dec 2022 12:16) (94% - 100%)    Parameters below as of 29 Dec 2022 12:16  Patient On (Oxygen Delivery Method): nasal cannula  O2 Flow (L/min): 2      PHYSICAL EXAM:  General: NAD  Head: NC/AT; MMM  Neck: Supple  Respiratory: CTAB; slightly tachypneic  Cardiovascular: Regular rhythm/rate; S1/S2+  Gastrointestinal: Soft; NTND; bowel sounds normal and present  Extremities: well-perfused; no edema/cyanosis, LLE warm to touch, RLE cool on exam  Neurological: A&Ox2 to person and time    MEDICATIONS:  MEDICATIONS  (STANDING):  apixaban 10 milliGRAM(s) Oral every 12 hours  divalproex  milliGRAM(s) Oral <User Schedule>  famotidine    Tablet 20 milliGRAM(s) Oral two times a day  PHENobarbital 16.2 milliGRAM(s) Oral every 12 hours  sodium chloride 0.9% lock flush 3 milliLiter(s) IV Push every 8 hours    MEDICATIONS  (PRN):      ALLERGIES:  Allergies    clindamycin (Anaphylaxis)  Dilantin (Unknown)  penicillin (Unknown)  penicillins (Flushing; Rash)    Intolerances        LABS:                        11.3   6.78  )-----------( 233      ( 29 Dec 2022 05:30 )             32.6     12-29    130<L>  |  94<L>  |  6<L>  ----------------------------<  69<L>  4.0   |  25  |  0.41<L>    Ca    8.7      29 Dec 2022 05:30  Phos  3.2     12-29  Mg     1.9     12-29    TPro  5.9<L>  /  Alb  2.4<L>  /  TBili  0.4  /  DBili  x   /  AST  12  /  ALT  6<L>  /  AlkPhos  90  12-29    PT/INR - ( 29 Dec 2022 05:30 )   PT: 13.8 sec;   INR: 1.16          PTT - ( 29 Dec 2022 05:30 )  PTT:49.6 sec    CAPILLARY BLOOD GLUCOSE      POCT Blood Glucose.: 83 mg/dL (28 Dec 2022 10:58)      RADIOLOGY & ADDITIONAL TESTS: Reviewed. ***Acceptance note from [facility] to [facility]***  81 y/o F transfer from [facility]: [facility] with pmhx dementia,  epilepsy, GERD, diverticulitis s/p bowel resection, SVT and recent ORIF for femoral neck fx. She presented with AMS and acute hypoxic respiratory failure. Patient admitted to ICU with working diagnosis of septic shock due to presumed pneumonia, s/p vancomycin and aztreonam (reported PCN and clindamycin allergy). The patient had a persistent low dose vasopressor requirement. The following morning she developed new-onset Afib with RVR and increasing hypoxia, which responded to amiodarone bolus. She later underwent CTA PE and was found to have extensive bilateral PEs with evidence of right heart strain. LE venous ultrasound confirmed the presence of a left femoral DVT. TTE obtained showed preserved LVEF 65%/ RA dilation with septal flattening/ mild Pulm HTN PASP 46. Ultimately decision made to administer 1/2 dose tPA. Her pressors were changed from levophed to phenylephrine and dobutamine. Patient was transferred to [facility] on heparin drip and 4L NC stable, admitted to CT surgery, Dr. Martínez, for consideration of mechanical thrombectomy given large proximal thrombus burden. Patient was titrated off phenylephrine and dobutamine. Patient not a good candidate for thrombectomy, stepped down to [facility] for medical management of b/l PE’s and LLE femoral vein DVT. Patient was on heparin gtt transitioned to eliquis 10mg BID starting 12/29 9AM. Patient stepped down to [facility].     OVERNIGHT EVENTS: EVERARDO    SUBJECTIVE / INTERVAL HPI: Patient seen and examined at bedside.     VITAL SIGNS:  Vital Signs Last 24 Hrs  T(C): 36.6 (29 Dec 2022 11:04), Max: 36.8 (29 Dec 2022 06:28)  T(F): 97.8 (29 Dec 2022 11:04), Max: 98.2 (29 Dec 2022 06:28)  HR: 92 (29 Dec 2022 12:16) (80 - 110)  BP: 112/54 (29 Dec 2022 12:16) (112/54 - 152/81)  BP(mean): 78 (29 Dec 2022 12:16) (77 - 110)  RR: 18 (29 Dec 2022 12:16) (18 - 33)  SpO2: 97% (29 Dec 2022 12:16) (94% - 100%)    Parameters below as of 29 Dec 2022 12:16  Patient On (Oxygen Delivery Method): nasal cannula  O2 Flow (L/min): 2      PHYSICAL EXAM:  General: NAD  Head: NC/AT; MMM  Neck: Supple  Respiratory: CTAB; slightly tachypneic  Cardiovascular: Regular rhythm/rate; S1/S2+  Gastrointestinal: Soft; NTND; bowel sounds normal and present  Extremities: well-perfused; no edema/cyanosis, LLE warm to touch, RLE cool on exam  Neurological: A&Ox2 to person and time    MEDICATIONS:  MEDICATIONS  (STANDING):  apixaban 10 milliGRAM(s) Oral every 12 hours  divalproex  milliGRAM(s) Oral <User Schedule>  famotidine    Tablet 20 milliGRAM(s) Oral two times a day  PHENobarbital 16.2 milliGRAM(s) Oral every 12 hours  sodium chloride 0.9% lock flush 3 milliLiter(s) IV Push every 8 hours    MEDICATIONS  (PRN):      ALLERGIES:  Allergies    clindamycin (Anaphylaxis)  Dilantin (Unknown)  penicillin (Unknown)  penicillins (Flushing; Rash)    Intolerances        LABS:                        11.3   6.78  )-----------( 233      ( 29 Dec 2022 05:30 )             32.6     12-29    130<L>  |  94<L>  |  6<L>  ----------------------------<  69<L>  4.0   |  25  |  0.41<L>    Ca    8.7      29 Dec 2022 05:30  Phos  3.2     12-29  Mg     1.9     12-29    TPro  5.9<L>  /  Alb  2.4<L>  /  TBili  0.4  /  DBili  x   /  AST  12  /  ALT  6<L>  /  AlkPhos  90  12-29    PT/INR - ( 29 Dec 2022 05:30 )   PT: 13.8 sec;   INR: 1.16          PTT - ( 29 Dec 2022 05:30 )  PTT:49.6 sec    CAPILLARY BLOOD GLUCOSE      POCT Blood Glucose.: 83 mg/dL (28 Dec 2022 10:58)      RADIOLOGY & ADDITIONAL TESTS: Reviewed.

## 2022-12-29 NOTE — OCCUPATIONAL THERAPY INITIAL EVALUATION ADULT - DIAGNOSIS, OT EVAL
Pt presenting with impaired cognition, decreased strength, impaired postural control, impaired balance, and poor functional activity tolerance, impacting ability to perform functional mobility/ADLs.

## 2022-12-30 VITALS
RESPIRATION RATE: 17 BRPM | HEART RATE: 92 BPM | DIASTOLIC BLOOD PRESSURE: 76 MMHG | OXYGEN SATURATION: 97 % | SYSTOLIC BLOOD PRESSURE: 117 MMHG

## 2022-12-30 LAB
ALBUMIN SERPL ELPH-MCNC: 2.6 G/DL — LOW (ref 3.3–5)
ALP SERPL-CCNC: 77 U/L — SIGNIFICANT CHANGE UP (ref 40–120)
ALT FLD-CCNC: 6 U/L — LOW (ref 10–45)
ANION GAP SERPL CALC-SCNC: 8 MMOL/L — SIGNIFICANT CHANGE UP (ref 5–17)
ANISOCYTOSIS BLD QL: SLIGHT — SIGNIFICANT CHANGE UP
APTT BLD: 32.6 SEC — SIGNIFICANT CHANGE UP (ref 27.5–35.5)
AST SERPL-CCNC: 15 U/L — SIGNIFICANT CHANGE UP (ref 10–40)
BASOPHILS # BLD AUTO: 0.05 K/UL — SIGNIFICANT CHANGE UP (ref 0–0.2)
BASOPHILS NFR BLD AUTO: 0.9 % — SIGNIFICANT CHANGE UP (ref 0–2)
BILIRUB SERPL-MCNC: 0.3 MG/DL — SIGNIFICANT CHANGE UP (ref 0.2–1.2)
BUN SERPL-MCNC: 6 MG/DL — LOW (ref 7–23)
CALCIUM SERPL-MCNC: 8.4 MG/DL — SIGNIFICANT CHANGE UP (ref 8.4–10.5)
CHLORIDE SERPL-SCNC: 92 MMOL/L — LOW (ref 96–108)
CO2 SERPL-SCNC: 26 MMOL/L — SIGNIFICANT CHANGE UP (ref 22–31)
CREAT SERPL-MCNC: 0.43 MG/DL — LOW (ref 0.5–1.3)
EGFR: 96 ML/MIN/1.73M2 — SIGNIFICANT CHANGE UP
EOSINOPHIL # BLD AUTO: 0.2 K/UL — SIGNIFICANT CHANGE UP (ref 0–0.5)
EOSINOPHIL NFR BLD AUTO: 3.5 % — SIGNIFICANT CHANGE UP (ref 0–6)
GIANT PLATELETS BLD QL SMEAR: PRESENT — SIGNIFICANT CHANGE UP
GLUCOSE SERPL-MCNC: 72 MG/DL — SIGNIFICANT CHANGE UP (ref 70–99)
HCT VFR BLD CALC: 32.9 % — LOW (ref 34.5–45)
HGB BLD-MCNC: 10.9 G/DL — LOW (ref 11.5–15.5)
INR BLD: 1.27 — HIGH (ref 0.88–1.16)
LYMPHOCYTES # BLD AUTO: 1.2 K/UL — SIGNIFICANT CHANGE UP (ref 1–3.3)
LYMPHOCYTES # BLD AUTO: 20.8 % — SIGNIFICANT CHANGE UP (ref 13–44)
MACROCYTES BLD QL: SLIGHT — SIGNIFICANT CHANGE UP
MAGNESIUM SERPL-MCNC: 1.7 MG/DL — SIGNIFICANT CHANGE UP (ref 1.6–2.6)
MANUAL SMEAR VERIFICATION: SIGNIFICANT CHANGE UP
MCHC RBC-ENTMCNC: 31.2 PG — SIGNIFICANT CHANGE UP (ref 27–34)
MCHC RBC-ENTMCNC: 33.1 GM/DL — SIGNIFICANT CHANGE UP (ref 32–36)
MCV RBC AUTO: 94.3 FL — SIGNIFICANT CHANGE UP (ref 80–100)
MONOCYTES # BLD AUTO: 0.55 K/UL — SIGNIFICANT CHANGE UP (ref 0–0.9)
MONOCYTES NFR BLD AUTO: 9.5 % — SIGNIFICANT CHANGE UP (ref 2–14)
MYELOCYTES NFR BLD: 0.9 % — HIGH (ref 0–0)
NEUTROPHILS # BLD AUTO: 3.66 K/UL — SIGNIFICANT CHANGE UP (ref 1.8–7.4)
NEUTROPHILS NFR BLD AUTO: 62.6 % — SIGNIFICANT CHANGE UP (ref 43–77)
NEUTS BAND # BLD: 0.9 % — SIGNIFICANT CHANGE UP (ref 0–8)
OVALOCYTES BLD QL SMEAR: SLIGHT — SIGNIFICANT CHANGE UP
PHOSPHATE SERPL-MCNC: 3.2 MG/DL — SIGNIFICANT CHANGE UP (ref 2.5–4.5)
PLAT MORPH BLD: ABNORMAL
PLATELET # BLD AUTO: 214 K/UL — SIGNIFICANT CHANGE UP (ref 150–400)
POIKILOCYTOSIS BLD QL AUTO: SLIGHT — SIGNIFICANT CHANGE UP
POLYCHROMASIA BLD QL SMEAR: SLIGHT — SIGNIFICANT CHANGE UP
POTASSIUM SERPL-MCNC: 4.1 MMOL/L — SIGNIFICANT CHANGE UP (ref 3.5–5.3)
POTASSIUM SERPL-SCNC: 4.1 MMOL/L — SIGNIFICANT CHANGE UP (ref 3.5–5.3)
PROT SERPL-MCNC: 5.7 G/DL — LOW (ref 6–8.3)
PROTHROM AB SERPL-ACNC: 15.2 SEC — HIGH (ref 10.5–13.4)
RBC # BLD: 3.49 M/UL — LOW (ref 3.8–5.2)
RBC # FLD: 14.9 % — HIGH (ref 10.3–14.5)
RBC BLD AUTO: ABNORMAL
SODIUM SERPL-SCNC: 126 MMOL/L — LOW (ref 135–145)
VARIANT LYMPHS # BLD: 0.9 % — SIGNIFICANT CHANGE UP (ref 0–6)
WBC # BLD: 5.77 K/UL — SIGNIFICANT CHANGE UP (ref 3.8–10.5)
WBC # FLD AUTO: 5.77 K/UL — SIGNIFICANT CHANGE UP (ref 3.8–10.5)

## 2022-12-30 PROCEDURE — 83880 ASSAY OF NATRIURETIC PEPTIDE: CPT

## 2022-12-30 PROCEDURE — 97165 OT EVAL LOW COMPLEX 30 MIN: CPT

## 2022-12-30 PROCEDURE — 85730 THROMBOPLASTIN TIME PARTIAL: CPT

## 2022-12-30 PROCEDURE — 84443 ASSAY THYROID STIM HORMONE: CPT

## 2022-12-30 PROCEDURE — 85025 COMPLETE CBC W/AUTO DIFF WBC: CPT

## 2022-12-30 PROCEDURE — 84132 ASSAY OF SERUM POTASSIUM: CPT

## 2022-12-30 PROCEDURE — 84100 ASSAY OF PHOSPHORUS: CPT

## 2022-12-30 PROCEDURE — 83036 HEMOGLOBIN GLYCOSYLATED A1C: CPT

## 2022-12-30 PROCEDURE — 85027 COMPLETE CBC AUTOMATED: CPT

## 2022-12-30 PROCEDURE — 86901 BLOOD TYPING SEROLOGIC RH(D): CPT

## 2022-12-30 PROCEDURE — 83735 ASSAY OF MAGNESIUM: CPT

## 2022-12-30 PROCEDURE — 36415 COLL VENOUS BLD VENIPUNCTURE: CPT

## 2022-12-30 PROCEDURE — 99239 HOSP IP/OBS DSCHRG MGMT >30: CPT

## 2022-12-30 PROCEDURE — 85610 PROTHROMBIN TIME: CPT

## 2022-12-30 PROCEDURE — 86850 RBC ANTIBODY SCREEN: CPT

## 2022-12-30 PROCEDURE — 93306 TTE W/DOPPLER COMPLETE: CPT

## 2022-12-30 PROCEDURE — 84436 ASSAY OF TOTAL THYROXINE: CPT

## 2022-12-30 PROCEDURE — 83605 ASSAY OF LACTIC ACID: CPT

## 2022-12-30 PROCEDURE — 71045 X-RAY EXAM CHEST 1 VIEW: CPT

## 2022-12-30 PROCEDURE — 84295 ASSAY OF SERUM SODIUM: CPT

## 2022-12-30 PROCEDURE — 80053 COMPREHEN METABOLIC PANEL: CPT

## 2022-12-30 PROCEDURE — 82803 BLOOD GASES ANY COMBINATION: CPT

## 2022-12-30 PROCEDURE — 83935 ASSAY OF URINE OSMOLALITY: CPT

## 2022-12-30 PROCEDURE — 84484 ASSAY OF TROPONIN QUANT: CPT

## 2022-12-30 PROCEDURE — 82330 ASSAY OF CALCIUM: CPT

## 2022-12-30 PROCEDURE — 82962 GLUCOSE BLOOD TEST: CPT

## 2022-12-30 PROCEDURE — 86900 BLOOD TYPING SEROLOGIC ABO: CPT

## 2022-12-30 PROCEDURE — 84300 ASSAY OF URINE SODIUM: CPT

## 2022-12-30 PROCEDURE — 80061 LIPID PANEL: CPT

## 2022-12-30 RX ORDER — DIVALPROEX SODIUM 500 MG/1
1 TABLET, DELAYED RELEASE ORAL
Qty: 0 | Refills: 0 | DISCHARGE
Start: 2022-12-30

## 2022-12-30 RX ORDER — APIXABAN 2.5 MG/1
2 TABLET, FILM COATED ORAL
Qty: 0 | Refills: 0 | DISCHARGE
Start: 2022-12-30

## 2022-12-30 RX ORDER — FAMOTIDINE 10 MG/ML
1 INJECTION INTRAVENOUS
Qty: 0 | Refills: 0 | DISCHARGE
Start: 2022-12-30

## 2022-12-30 RX ORDER — MAGNESIUM SULFATE 500 MG/ML
1 VIAL (ML) INJECTION ONCE
Refills: 0 | Status: DISCONTINUED | OUTPATIENT
Start: 2022-12-30 | End: 2022-12-30

## 2022-12-30 RX ORDER — PHENOBARBITAL 60 MG
1 TABLET ORAL
Qty: 0 | Refills: 0 | DISCHARGE
Start: 2022-12-30

## 2022-12-30 RX ORDER — APIXABAN 2.5 MG/1
2 TABLET, FILM COATED ORAL
Qty: 0 | Refills: 0 | DISCHARGE
Start: 2022-12-30 | End: 2023-01-04

## 2022-12-30 RX ADMIN — FAMOTIDINE 20 MILLIGRAM(S): 10 INJECTION INTRAVENOUS at 05:54

## 2022-12-30 RX ADMIN — Medication 16.2 MILLIGRAM(S): at 12:54

## 2022-12-30 RX ADMIN — APIXABAN 10 MILLIGRAM(S): 2.5 TABLET, FILM COATED ORAL at 09:06

## 2022-12-30 RX ADMIN — DIVALPROEX SODIUM 500 MILLIGRAM(S): 500 TABLET, DELAYED RELEASE ORAL at 09:06

## 2022-12-30 RX ADMIN — SODIUM CHLORIDE 3 MILLILITER(S): 9 INJECTION INTRAMUSCULAR; INTRAVENOUS; SUBCUTANEOUS at 08:36

## 2022-12-30 RX ADMIN — Medication 16.2 MILLIGRAM(S): at 02:08

## 2022-12-30 NOTE — PROGRESS NOTE ADULT - TIME BILLING
Patient seen and examined with house-staff during bedside rounds.  Resident note read, including vitals, physical findings, laboratory data, and radiological reports.   Revisions included below.  Direct personal management at bed side and extensive interpretation of the data.  Plan was outlined and discussed in details with the housestaff.  Decision making of high complexity  Action taken for acute disease activity to reflect the level of care provided:  - medication reconciliation  - review laboratory data  Stable.  Patient to continue anticoagulation.  The hemoglobin is stable.  The patient is to be transferred to rehab.
Patient seen and examined with house-staff during bedside rounds.  Resident note read, including vitals, physical findings, laboratory data, and radiological reports.   Revisions included below.  Direct personal management at bed side and extensive interpretation of the data.  Plan was outlined and discussed in details with the housestaff.  Decision making of high complexity  Action taken for acute disease activity to reflect the level of care provided:  - medication reconciliation  - review laboratory data  The patient is clinically stable.  Patient is on room air.  He is evaluate for dysphagia.  Change to oral.  Out of bed in chair.  Continue seizure medication.  Physical therapy evaluation.  The patient will require to go to rehab
Patient seen and examined with house-staff during bedside rounds.  Resident note read, including vitals, physical findings, laboratory data, and radiological reports.   Revisions included below.  Direct personal management at bed side and extensive interpretation of the data.  Plan was outlined and discussed in details with the housestaff.  Decision making of high complexity  Action taken for acute disease activity to reflect the level of care provided:  - medication reconciliation  - review laboratory data  I discussed the case with CTS and was weaned of the dobutamine.  She is hemodynamically stable.  No thoracentesis.  ECHO reviewed \ Change to Lovenox

## 2022-12-30 NOTE — PROGRESS NOTE ADULT - NUTRITIONAL ASSESSMENT
This patient has been assessed with a concern for Malnutrition and has been determined to have a diagnosis/diagnoses of Severe protein-calorie malnutrition.    This patient is being managed with:   Diet DASH/TLC-  Sodium & Cholesterol Restricted  Entered: Dec 28 2022  8:56AM    

## 2022-12-30 NOTE — DISCHARGE NOTE NURSING/CASE MANAGEMENT/SOCIAL WORK - PATIENT PORTAL LINK FT
You can access the FollowMyHealth Patient Portal offered by [facility] by registering at the following website: [email or web address]. By joining [facility]’s FollowMyHealth portal, you will also be able to view your health information using other applications (apps) compatible with our system.

## 2022-12-30 NOTE — DISCHARGE NOTE NURSING/CASE MANAGEMENT/SOCIAL WORK - NSDCPEFALRISK_GEN_ALL_CORE
For information on Fall & Injury Prevention, visit: [email or web address] OR  [email or web address] OR  https://www.cdc.gov/steadi/patient.html

## 2023-01-05 DIAGNOSIS — G40.909 EPILEPSY, UNSPECIFIED, NOT INTRACTABLE, WITHOUT STATUS EPILEPTICUS: ICD-10-CM

## 2023-01-05 DIAGNOSIS — Z92.82 STATUS POST ADMINISTRATION OF TPA (RTPA) IN A DIFFERENT FACILITY WITHIN THE LAST 24 HOURS PRIOR TO ADMISSION TO CURRENT FACILITY: ICD-10-CM

## 2023-01-05 DIAGNOSIS — I26.99 OTHER PULMONARY EMBOLISM WITHOUT ACUTE COR PULMONALE: ICD-10-CM

## 2023-01-05 DIAGNOSIS — E87.1 HYPO-OSMOLALITY AND HYPONATREMIA: ICD-10-CM

## 2023-01-05 DIAGNOSIS — I48.0 PAROXYSMAL ATRIAL FIBRILLATION: ICD-10-CM

## 2023-01-05 DIAGNOSIS — R64 CACHEXIA: ICD-10-CM

## 2023-01-05 DIAGNOSIS — I82.412 ACUTE EMBOLISM AND THROMBOSIS OF LEFT FEMORAL VEIN: ICD-10-CM

## 2023-01-05 DIAGNOSIS — Z53.09 PROCEDURE AND TREATMENT NOT CARRIED OUT BECAUSE OF OTHER CONTRAINDICATION: ICD-10-CM

## 2023-01-05 DIAGNOSIS — E43 UNSPECIFIED SEVERE PROTEIN-CALORIE MALNUTRITION: ICD-10-CM

## 2023-01-05 DIAGNOSIS — K21.9 GASTRO-ESOPHAGEAL REFLUX DISEASE WITHOUT ESOPHAGITIS: ICD-10-CM

## 2023-01-05 DIAGNOSIS — I47.1 SUPRAVENTRICULAR TACHYCARDIA: ICD-10-CM

## 2023-01-05 RX ORDER — APIXABAN 2.5 MG/1
1 TABLET, FILM COATED ORAL
Qty: 0 | Refills: 0 | DISCHARGE
Start: 2023-01-05

## 2023-04-28 NOTE — DISCHARGE NOTE PROVIDER - YES NO FOR MLM POSITIVE OR NEGATIVE COVID RESULT
[FreeTextEntry1] : HTN- controlled\par \par HLD- not on statin, Atorvastatin 40 mg ordered\par \par DM- diet reviewed\par \par Tobacco- discussed in detail, options noted\par \par High rislk for CAD- Coronary Calcium Score ordered\par \par Tachycardia- Sinus Tach, echo\par 
,

## 2025-04-28 NOTE — DIETITIAN INITIAL EVALUATION ADULT - CALCULATED FROM (ML/KG)
1500
Pt reports midsternal chest pain since Saturday with pain on inspiration, sent from urgent care with abnormal ekg

## 2025-06-19 NOTE — PROCEDURE NOTE - NSPOSTCAREGUIDE_GEN_A_CORE
Pt arrived via Triage c/o lightheaded with a headache. Concerned her sugar is naty. Pt did not check it today. States she takes Humalog 10 units earlier.   
Verbal/written post procedure instructions were given to patient/caregiver